# Patient Record
Sex: FEMALE | Race: WHITE | Employment: OTHER | URBAN - METROPOLITAN AREA
[De-identification: names, ages, dates, MRNs, and addresses within clinical notes are randomized per-mention and may not be internally consistent; named-entity substitution may affect disease eponyms.]

---

## 2020-11-11 LAB
ALBUMIN SERPL-MCNC: NORMAL G/DL
ALP BLD-CCNC: NORMAL U/L
ALT SERPL-CCNC: NORMAL U/L
ANION GAP SERPL CALCULATED.3IONS-SCNC: NORMAL MMOL/L
AST SERPL-CCNC: NORMAL U/L
AVERAGE GLUCOSE: NORMAL
BILIRUB SERPL-MCNC: NORMAL MG/DL
BUN BLDV-MCNC: NORMAL MG/DL
CALCIUM SERPL-MCNC: NORMAL MG/DL
CHLORIDE BLD-SCNC: NORMAL MMOL/L
CO2: NORMAL
CREAT SERPL-MCNC: NORMAL MG/DL
GFR CALCULATED: NORMAL
GLUCOSE BLD-MCNC: NORMAL MG/DL
HBA1C MFR BLD: 6.5 %
POTASSIUM SERPL-SCNC: NORMAL MMOL/L
SODIUM BLD-SCNC: NORMAL MMOL/L
TOTAL PROTEIN: NORMAL
VITAMIN D 25-HYDROXY: NORMAL
VITAMIN D2, 25 HYDROXY: NORMAL
VITAMIN D3,25 HYDROXY: NORMAL

## 2020-11-12 LAB
CHOLESTEROL, TOTAL: NORMAL
CHOLESTEROL/HDL RATIO: NORMAL
HDLC SERPL-MCNC: NORMAL MG/DL
LDL CHOLESTEROL CALCULATED: NORMAL
NONHDLC SERPL-MCNC: NORMAL MG/DL
TRIGL SERPL-MCNC: NORMAL MG/DL
VLDLC SERPL CALC-MCNC: NORMAL MG/DL

## 2021-04-13 LAB
ALBUMIN SERPL-MCNC: NORMAL G/DL
ALP BLD-CCNC: NORMAL U/L
ALT SERPL-CCNC: NORMAL U/L
ANION GAP SERPL CALCULATED.3IONS-SCNC: NORMAL MMOL/L
AST SERPL-CCNC: NORMAL U/L
AVERAGE GLUCOSE: NORMAL
BILIRUB SERPL-MCNC: NORMAL MG/DL
BUN BLDV-MCNC: NORMAL MG/DL
CALCIUM SERPL-MCNC: NORMAL MG/DL
CHLORIDE BLD-SCNC: NORMAL MMOL/L
CHOLESTEROL, TOTAL: NORMAL
CHOLESTEROL/HDL RATIO: NORMAL
CO2: NORMAL
CREAT SERPL-MCNC: NORMAL MG/DL
GFR CALCULATED: NORMAL
GLUCOSE BLD-MCNC: NORMAL MG/DL
HBA1C MFR BLD: 5.9 %
HDLC SERPL-MCNC: NORMAL MG/DL
LDL CHOLESTEROL CALCULATED: NORMAL
NONHDLC SERPL-MCNC: NORMAL MG/DL
POTASSIUM SERPL-SCNC: NORMAL MMOL/L
SODIUM BLD-SCNC: NORMAL MMOL/L
TOTAL PROTEIN: NORMAL
TRIGL SERPL-MCNC: NORMAL MG/DL
VITAMIN D 25-HYDROXY: NORMAL
VITAMIN D2, 25 HYDROXY: NORMAL
VITAMIN D3,25 HYDROXY: NORMAL
VLDLC SERPL CALC-MCNC: NORMAL MG/DL

## 2021-06-23 ENCOUNTER — OFFICE VISIT (OUTPATIENT)
Dept: BARIATRICS/WEIGHT MGMT | Age: 71
End: 2021-06-23
Payer: MEDICARE

## 2021-06-23 VITALS
BODY MASS INDEX: 45.99 KG/M2 | HEART RATE: 57 BPM | SYSTOLIC BLOOD PRESSURE: 132 MMHG | TEMPERATURE: 96.9 F | DIASTOLIC BLOOD PRESSURE: 61 MMHG | RESPIRATION RATE: 20 BRPM | WEIGHT: 293 LBS | HEIGHT: 67 IN

## 2021-06-23 DIAGNOSIS — E66.01 CLASS 3 SEVERE OBESITY DUE TO EXCESS CALORIES WITH SERIOUS COMORBIDITY AND BODY MASS INDEX (BMI) OF 45.0 TO 49.9 IN ADULT (HCC): ICD-10-CM

## 2021-06-23 DIAGNOSIS — M25.561 CHRONIC PAIN OF RIGHT KNEE: Primary | ICD-10-CM

## 2021-06-23 DIAGNOSIS — G89.29 CHRONIC PAIN OF RIGHT KNEE: Primary | ICD-10-CM

## 2021-06-23 PROCEDURE — G8399 PT W/DXA RESULTS DOCUMENT: HCPCS | Performed by: INTERNAL MEDICINE

## 2021-06-23 PROCEDURE — 99202 OFFICE O/P NEW SF 15 MIN: CPT

## 2021-06-23 PROCEDURE — 99204 OFFICE O/P NEW MOD 45 MIN: CPT | Performed by: INTERNAL MEDICINE

## 2021-06-23 PROCEDURE — G8428 CUR MEDS NOT DOCUMENT: HCPCS | Performed by: INTERNAL MEDICINE

## 2021-06-23 PROCEDURE — 4040F PNEUMOC VAC/ADMIN/RCVD: CPT | Performed by: INTERNAL MEDICINE

## 2021-06-23 PROCEDURE — 1036F TOBACCO NON-USER: CPT | Performed by: INTERNAL MEDICINE

## 2021-06-23 PROCEDURE — 1090F PRES/ABSN URINE INCON ASSESS: CPT | Performed by: INTERNAL MEDICINE

## 2021-06-23 PROCEDURE — 1123F ACP DISCUSS/DSCN MKR DOCD: CPT | Performed by: INTERNAL MEDICINE

## 2021-06-23 PROCEDURE — 3017F COLORECTAL CA SCREEN DOC REV: CPT | Performed by: INTERNAL MEDICINE

## 2021-06-23 PROCEDURE — G8417 CALC BMI ABV UP PARAM F/U: HCPCS | Performed by: INTERNAL MEDICINE

## 2021-06-23 NOTE — PROGRESS NOTES
CC -   Ortho Referral (Dr. Kya Jara) - preparing for TKA  Right knee pain, Obesity    BACKGROUND -   First visit: 6/23/21     Obesity   Began in childhood  Initial BMI 47.4, Wt 298.0 lbs  HS Grad wt unknown (no scale in [facility]; weighed 200 lbs upon coming to [address] at age 24)   Lowest   wt 189 lbs  Highest  wt  303 lbs  Pattern of wt gain: grad  Wt change past yr: down 4 lbs  Most wt lost: 40 lbs (eat less/right)  Other diets attempted: none    Desire to lose weight: 10/10  Prob posed by appetite: 7-8/10    Initial Diet:    Number of meals per day - 3    Number of snacks per day - 3    Meal volume - 12\" plate, sometimes seconds    Fast food/convenience store - 0-1x/week    Restaurants (not fast food) - 0x/week   Sweets - 2d/week (one piece of cake or sweet bread)   Chips - 2d/week (1 handful of sweet potato)    Crackers/pretzels - 2-3d/week (4-5 saltines or multigrain)   Nuts - 1d/week   Peanut Butter - 0d/week   Popcorn - 0d/week   Dried fruit - 2-3d/week (5 prunes (23 joselito each))   Whole fruit - 7d/week (1-2 servings/day)   Breakfast cereal - 1d/week (oatmeal)   Granola/Protein/Energy bar - 0d/week   Sugar sweetened beverages - none   Protein - No supplements   Fiber - No supplements     Exercise:    Gym membership - none    Walking - none    Running - none    Resistance - none    Aerobic class - none      ______________________    [facility] -  Past Medical History:   Diagnosis Date    Arthritis     Cataract, right eye     Chronic pain of right knee     Class 3 severe obesity due to excess calories with serious comorbidity and body mass index (BMI) of 45.0 to 49.9 in adult (HCC)     Diabetes mellitus (HCC)     Hypertension      Current Outpatient Medications   Medication Sig Dispense Refill    Naproxen (NAPROSYN PO) Take by mouth      pioglitazone (ACTOS) 30 MG tablet Take 30 mg by mouth daily      valsartan-hydrochlorothiazide (DIOVAN-HCT) 160-25 MG per tablet Take 1 tablet by mouth nightly      Cholecalciferol (VITAMIN D3) 1000 units CAPS Take by mouth daily Instructed to hold 5 days pre-op      OMEGA-3 KRILL OIL PO Take 1 tablet by mouth daily Instructed to hold 5 days pre-op      aspirin 81 MG tablet Take 81 mg by mouth daily To be held 10 days  per Dr. Polly Price HCL PO Take 240 mg by mouth nightly       METFORMIN HCL PO Take 500 mg by mouth 2 times daily        No current facility-administered medications for this visit. ROS -  Card - no CP  GI - no N/V/D/C    PE -  Gen : BP (!) 165/79 (Site: Right Lower Arm, Position: Sitting, Cuff Size: Large Adult)   Pulse 58   Temp 96.9 °F (36.1 °C) (Temporal)   Resp 20   Ht 5' 6.5\" (1.689 m)   Wt 298 lb (135.2 kg)   BMI 47.38 kg/m²    Repeat /61   WN, WD, NAD  Lung: Nml resp effort  Psych: Normal mood   Full affect  Neuro:  Moves all ext well  ______________________    HISTORY & ASSESSMENT/PLAN -     Problem 1  - Right knee pain  HPI   - Began age 79      Severity past week: 6/10       Sees Dr. Naomie James TKA      Must first reduce BMI to 40 (wt 255 lbs)  Assessment  - Uncontrolled  Plan   - Proceed with wt reduction per the plan below    Problem 2  - Obesity   HPI   - See above Background for description    Weight  Date    298.0 lbs 6/23/21    Assessment  - uncontrolled; her diet is clean; proceed with the following low joselito, low carb diet  Plan   -   Count all calories and limit them to 900 calories per day    Use the plan below as a guide for this:    Breakfast  -   4 oz of grilled, broiled or baked turkey, chicken or fish    or 3 whole eggs with 3/4 cup of Egg Beaters (egg whites) (may add any vegetable that has <5 joselito)   or a protein shake (Premier, Pure Protein or Fairlife  Eat 22 grams of fiber (12 tablespoons of the original, plain Fiber One cereal or 4 tablespoons of wheat dextrin powder (Benefiber or generic brand); for both of these, start with 1/8th - 1/4th the target amount and every week add another 1/8th - 1/4th until reaching the target). This is for the health of the colon. Its purpose is not to prevent or treat constipation.     Lunch -   4 oz of grilled, broiled or baked turkey, chicken or fish    or 3 whole eggs with 3/4 cup of Egg Beaters (egg whites) (may add any vegetable that has <5 josleito)   or a protein shake (Premier, Pure Protein or Fairlife  Eat 1 1/2 tablespoon of oil-based dressing (such as Ranch or Caesar) or 1/4 cup of [place] dressing   or 1 and 1/2 tablespoon of peanut butter   or 22 almonds or cashews    Dinner -  Eat a 350 joselito meal (do not eat any sweets)     Total time spent on encounter: 50 min    Gabriela Shaffer MD  Endocrinology/Obesity  6/23/21

## 2021-06-23 NOTE — PATIENT INSTRUCTIONS
Count all calories and limit them to 900 calories per day    Follow the plan below:    Breakfast  -   4 oz of grilled, broiled or baked turkey, chicken or fish    or 3 whole eggs with 3/4 cup of Egg Beaters (egg whites) (may add any vegetable that has <5 joselito)   or a protein shake (Premier, Pure Protein or Fairlife  Eat 22 grams of fiber (12 tablespoons of the original, plain Fiber One cereal or 4 tablespoons of wheat dextrin powder (Benefiber or generic brand); for both of these, start with 1/8th - 1/4th the target amount and every week add another 1/8th - 1/4th until reaching the target). This is for the health of the colon. Its purpose is not to prevent or treat constipation.     Lunch -   4 oz of grilled, broiled or baked turkey, chicken or fish    or 3 whole eggs with 3/4 cup of Egg Beaters (egg whites) (may add any vegetable that has <5 joselito)   or a protein shake (Premier, Pure Protein or Fairlife  Eat 1 1/2 tablespoon of oil-based dressing (such as Ranch or Caesar) or 1/4 cup of [place] dressing   or 1 and 1/2 tablespoon of peanut butter   or 22 almonds or cashews    Dinner -  Eat a 350 joselito meal (do not eat any sweets)

## 2021-07-29 ENCOUNTER — OFFICE VISIT (OUTPATIENT)
Dept: BARIATRICS/WEIGHT MGMT | Age: 71
End: 2021-07-29
Payer: MEDICARE

## 2021-07-29 VITALS
TEMPERATURE: 97.1 F | WEIGHT: 276.8 LBS | BODY MASS INDEX: 43.44 KG/M2 | DIASTOLIC BLOOD PRESSURE: 63 MMHG | SYSTOLIC BLOOD PRESSURE: 137 MMHG | HEIGHT: 67 IN | HEART RATE: 58 BPM

## 2021-07-29 DIAGNOSIS — M25.561 CHRONIC PAIN OF RIGHT KNEE: Primary | ICD-10-CM

## 2021-07-29 DIAGNOSIS — E66.01 CLASS 3 SEVERE OBESITY DUE TO EXCESS CALORIES WITH SERIOUS COMORBIDITY AND BODY MASS INDEX (BMI) OF 45.0 TO 49.9 IN ADULT (HCC): ICD-10-CM

## 2021-07-29 DIAGNOSIS — G89.29 CHRONIC PAIN OF RIGHT KNEE: Primary | ICD-10-CM

## 2021-07-29 PROCEDURE — G8428 CUR MEDS NOT DOCUMENT: HCPCS | Performed by: INTERNAL MEDICINE

## 2021-07-29 PROCEDURE — 99213 OFFICE O/P EST LOW 20 MIN: CPT | Performed by: INTERNAL MEDICINE

## 2021-07-29 PROCEDURE — 4040F PNEUMOC VAC/ADMIN/RCVD: CPT | Performed by: INTERNAL MEDICINE

## 2021-07-29 PROCEDURE — 1036F TOBACCO NON-USER: CPT | Performed by: INTERNAL MEDICINE

## 2021-07-29 PROCEDURE — 1090F PRES/ABSN URINE INCON ASSESS: CPT | Performed by: INTERNAL MEDICINE

## 2021-07-29 PROCEDURE — 99211 OFF/OP EST MAY X REQ PHY/QHP: CPT

## 2021-07-29 PROCEDURE — G8399 PT W/DXA RESULTS DOCUMENT: HCPCS | Performed by: INTERNAL MEDICINE

## 2021-07-29 PROCEDURE — 3017F COLORECTAL CA SCREEN DOC REV: CPT | Performed by: INTERNAL MEDICINE

## 2021-07-29 PROCEDURE — 1123F ACP DISCUSS/DSCN MKR DOCD: CPT | Performed by: INTERNAL MEDICINE

## 2021-07-29 PROCEDURE — G8417 CALC BMI ABV UP PARAM F/U: HCPCS | Performed by: INTERNAL MEDICINE

## 2021-07-29 NOTE — PROGRESS NOTES
CC -   Ortho Referral (Dr. Bennett ) - preparing for TKA  Right knee pain, Obesity    BACKGROUND -   Last visit: 6/23/21  First visit: 6/23/21     Obesity   Began in childhood  Initial BMI 47.4, Wt 298.0 lbs  HS Grad wt unknown (no scale in [address] [place] [address]; weighed 200 lbs upon coming to [address] [facility] at age 24)   Lowest   wt 189 lbs  Highest  wt  303 lbs  Pattern of wt gain: grad  Wt change past yr: down 4 lbs  Most wt lost: 40 lbs (eat less/right)  Other diets attempted: none    Desire to lose weight: 10/10  Prob posed by appetite: 7-8/10    Initial Diet:    Number of meals per day - 3    Number of snacks per day - 3    Meal volume - 12\" plate, sometimes seconds    Fast food/convenience store - 0-1x/week    Restaurants (not fast food) - 0x/week   Sweets - 2d/week (one piece of cake or sweet bread)   Chips - 2d/week (1 handful of sweet potato)    Crackers/pretzels - 2-3d/week (4-5 saltines or multigrain)   Nuts - 1d/week   Peanut Butter - 0d/week   Popcorn - 0d/week   Dried fruit - 2-3d/week (5 prunes (23 joselito each))   Whole fruit - 7d/week (1-2 servings/day)   Breakfast cereal - 1d/week (oatmeal)   Granola/Protein/Energy bar - 0d/week   Sugar sweetened beverages - none   Protein - No supplements   Fiber - No supplements     Exercise:    Gym membership - none    Walking - none    Running - none    Resistance - none    Aerobic class - none      ______________________    [facility] -  Past Medical History:   Diagnosis Date    Arthritis     Cataract, right eye     Chronic pain of right knee     Class 3 severe obesity due to excess calories with serious comorbidity and body mass index (BMI) of 45.0 to 49.9 in adult (HCC)     Diabetes mellitus (HCC)     Hypertension      Current Outpatient Medications   Medication Sig Dispense Refill    Naproxen (NAPROSYN PO) Take by mouth      pioglitazone (ACTOS) 30 MG tablet Take 30 mg by mouth daily      valsartan-hydrochlorothiazide (DIOVAN-HCT) 160-25 MG per tablet Take 1 tablet by mouth nightly      Cholecalciferol (VITAMIN D3) 1000 units CAPS Take by mouth daily Instructed to hold 5 days pre-op      OMEGA-3 KRILL OIL PO Take 1 tablet by mouth daily Instructed to hold 5 days pre-op      aspirin 81 MG tablet Take 81 mg by mouth daily To be held 10 days  per Dr. Jaqueline Hodge HCL PO Take 240 mg by mouth nightly       METFORMIN HCL PO Take 500 mg by mouth 2 times daily        No current facility-administered medications for this visit. PE -  Gen : /63 (Site: Left Upper Arm, Position: Sitting, Cuff Size: Large Adult)   Pulse 58   Temp 97.1 °F (36.2 °C) (Temporal)   Ht 5' 6.5\" (1.689 m)   Wt 276 lb 12.8 oz (125.6 kg)   BMI 44.01 kg/m²    Repeat /61   WN, WD, NAD  Lung: Nml resp effort  Psych: Normal mood   Full affect  Neuro: Moves all ext well  ______________________    HISTORY & ASSESSMENT/PLAN -     Problem 1  - Right knee pain  HPI   - Began age 79      Severity past week: 4/10       Sees Dr. Koby Clark TKA      Must first reduce BMI to 40 (wt 255 lbs)  Assessment  - Uncontrolled  Plan   - Proceed with wt reduction per the plan below    Problem 2  - Obesity   HPI   - See above Background for description    Weight  Date    298.0 lbs 6/23/21    276.8 lbs 7/29/21  Total wt loss to date: 21.2 lbs  DEN = 1850 joselito/d  Avg daily energy variance:   6/23/21-7/29/21 = 19.4 lbs (67,900 joselito/day)/35d = - 1,940 joselito/d deficit  Update:   Following her plan 7d/wk  Assessment  - Improving; cont to use the current plan without change  Plan   -   Count all calories and limit them to 900 calories per day  -120 mg/dl with only metformin 500 mg twice daily  Does not want an appetite suppressant    Use the plan below as a guide for this:    Breakfast  -   4 oz of grilled, broiled or baked turkey, chicken or fish    or 3 whole eggs with 3/4 cup of Egg Beaters (egg whites) (may add any vegetable that has <5 joselito)   or a protein shake (Premier, Pure Protein or [employer] 22 grams of fiber (12 tablespoons of the original, plain Fiber One cereal or 4 tablespoons of wheat dextrin powder (Benefiber or generic brand); for both of these, start with 1/8th - 1/4th the target amount and every week add another 1/8th - 1/4th until reaching the target). This is for the health of the colon. Its purpose is not to prevent or treat constipation.     Lunch -   4 oz of grilled, broiled or baked turkey, chicken or fish    or 3 whole eggs with 3/4 cup of Egg Beaters (egg whites) (may add any vegetable that has <5 joselito)   or a protein shake (Premier, Pure Protein or Fairlife  Eat 1 1/2 tablespoon of oil-based dressing (such as Ranch or Caesar) or 1/4 cup of [place] dressing   or 1 and 1/2 tablespoon of peanut butter   or 22 almonds or cashews    Dinner -  Eat a 350 joselito meal (do not eat any sweets)     Jenny Eagle MD  Endocrinology/Obesity  7/29/21

## 2021-07-29 NOTE — PATIENT INSTRUCTIONS
Count all calories and limit them to 900 calories per day    Use the plan below as a guide for this:    Breakfast  -   4 oz of grilled, broiled or baked turkey, chicken or fish    or 3 whole eggs with 3/4 cup of Egg Beaters (egg whites) (may add any vegetable that has <5 joselito)   or a protein shake (Premier, Pure Protein or Fairlife  Eat 22 grams of fiber (12 tablespoons of the original, plain Fiber One cereal or 4 tablespoons of wheat dextrin powder (Benefiber or generic brand); for both of these, start with 1/8th - 1/4th the target amount and every week add another 1/8th - 1/4th until reaching the target). This is for the health of the colon. Its purpose is not to prevent or treat constipation.     Lunch -   4 oz of grilled, broiled or baked turkey, chicken or fish    or 3 whole eggs with 3/4 cup of Egg Beaters (egg whites) (may add any vegetable that has <5 joselito)   or a protein shake (Premier, Pure Protein or Fairlife  Eat 1 1/2 tablespoon of oil-based dressing (such as Ranch or Caesar) or 1/4 cup of [place] dressing   or 1 and 1/2 tablespoon of peanut butter   or 22 almonds or cashews    Dinner -  Eat a 350 joselito meal (do not eat any sweets)

## 2021-08-24 ENCOUNTER — TELEPHONE (OUTPATIENT)
Dept: FAMILY MEDICINE CLINIC | Age: 71
End: 2021-08-24

## 2021-08-24 NOTE — TELEPHONE ENCOUNTER
----- Message from Surekha Sanon 12 sent at 8/24/2021 10:26 AM EDT -----  Subject: Message to Provider    QUESTIONS  Information for Provider? Patient has a new appointment with  on   9/15 until her doctor comes back in January. She takes the medication   METFORMIN HCL PO before her appointment and would to know if this can be   refilled until she is seen  ---------------------------------------------------------------------------  --------------  [address]  What is the best way for the office to contact you? OK to leave message on   voicemail  Preferred Call Back Phone Number? [phone]  ---------------------------------------------------------------------------  --------------  SCRIPT ANSWERS  Relationship to Patient?  Self

## 2021-08-24 NOTE — TELEPHONE ENCOUNTER
Patient will be out of metformin 2 days before appt and was advised that refill would be taken care of when she was seen in office,

## 2021-09-08 ENCOUNTER — OFFICE VISIT (OUTPATIENT)
Dept: BARIATRICS/WEIGHT MGMT | Age: 71
End: 2021-09-08
Payer: MEDICARE

## 2021-09-08 VITALS
HEART RATE: 66 BPM | BODY MASS INDEX: 41.15 KG/M2 | WEIGHT: 262.2 LBS | SYSTOLIC BLOOD PRESSURE: 142 MMHG | DIASTOLIC BLOOD PRESSURE: 71 MMHG | TEMPERATURE: 97.9 F | HEIGHT: 67 IN

## 2021-09-08 DIAGNOSIS — M25.561 CHRONIC PAIN OF RIGHT KNEE: Primary | ICD-10-CM

## 2021-09-08 DIAGNOSIS — E66.01 CLASS 3 SEVERE OBESITY DUE TO EXCESS CALORIES WITH SERIOUS COMORBIDITY AND BODY MASS INDEX (BMI) OF 45.0 TO 49.9 IN ADULT (HCC): ICD-10-CM

## 2021-09-08 DIAGNOSIS — G89.29 CHRONIC PAIN OF RIGHT KNEE: Primary | ICD-10-CM

## 2021-09-08 PROCEDURE — G8428 CUR MEDS NOT DOCUMENT: HCPCS | Performed by: INTERNAL MEDICINE

## 2021-09-08 PROCEDURE — 1036F TOBACCO NON-USER: CPT | Performed by: INTERNAL MEDICINE

## 2021-09-08 PROCEDURE — 1123F ACP DISCUSS/DSCN MKR DOCD: CPT | Performed by: INTERNAL MEDICINE

## 2021-09-08 PROCEDURE — 99213 OFFICE O/P EST LOW 20 MIN: CPT | Performed by: INTERNAL MEDICINE

## 2021-09-08 PROCEDURE — 1090F PRES/ABSN URINE INCON ASSESS: CPT | Performed by: INTERNAL MEDICINE

## 2021-09-08 PROCEDURE — 4040F PNEUMOC VAC/ADMIN/RCVD: CPT | Performed by: INTERNAL MEDICINE

## 2021-09-08 PROCEDURE — 3017F COLORECTAL CA SCREEN DOC REV: CPT | Performed by: INTERNAL MEDICINE

## 2021-09-08 PROCEDURE — G8399 PT W/DXA RESULTS DOCUMENT: HCPCS | Performed by: INTERNAL MEDICINE

## 2021-09-08 PROCEDURE — G8417 CALC BMI ABV UP PARAM F/U: HCPCS | Performed by: INTERNAL MEDICINE

## 2021-09-08 PROCEDURE — 99211 OFF/OP EST MAY X REQ PHY/QHP: CPT

## 2021-09-08 NOTE — PROGRESS NOTES
CC -   Ortho Referral (Dr. Nimesh Sandoval) - preparing for TKA  Right knee pain, Obesity    BACKGROUND -   Last visit: 7/29/21  First visit: 6/23/21     Obesity   Began in childhood  Initial BMI 47.4, Wt 298.0 lbs  HS Grad wt unknown (no scale in [address] [place] [address]; weighed 200 lbs upon coming to [facility] at age 24)   Lowest   wt 189 lbs  Highest  wt  303 lbs  Pattern of wt gain: grad  Wt change past yr: down 4 lbs  Most wt lost: 40 lbs (eat less/right)  Other diets attempted: none    Desire to lose weight: 10/10  Prob posed by appetite: 7-8/10    Initial Diet:    Number of meals per day - 3    Number of snacks per day - 3    Meal volume - 12\" plate, sometimes seconds    Fast food/convenience store - 0-1x/week    Restaurants (not fast food) - 0x/week   Sweets - 2d/week (one piece of cake or sweet bread)   Chips - 2d/week (1 handful of sweet potato)    Crackers/pretzels - 2-3d/week (4-5 saltines or multigrain)   Nuts - 1d/week   Peanut Butter - 0d/week   Popcorn - 0d/week   Dried fruit - 2-3d/week (5 prunes (23 joselito each))   Whole fruit - 7d/week (1-2 servings/day)   Breakfast cereal - 1d/week (oatmeal)   Granola/Protein/Energy bar - 0d/week   Sugar sweetened beverages - none   Protein - No supplements   Fiber - No supplements     Exercise:    Gym membership - none    Walking - none    Running - none    Resistance - none    Aerobic class - none      ______________________    [address] [place] [address] -  Past Medical History:   Diagnosis Date    Arthritis     Cataract, right eye     Chronic pain of right knee     Class 3 severe obesity due to excess calories with serious comorbidity and body mass index (BMI) of 45.0 to 49.9 in adult (HCC)     Diabetes mellitus (HCC)     Hypertension      Current Outpatient Medications   Medication Sig Dispense Refill    Naproxen (NAPROSYN PO) Take by mouth      valsartan-hydrochlorothiazide (DIOVAN-HCT) 160-25 MG per tablet Take 1 tablet by mouth nightly      Cholecalciferol (VITAMIN D3) 1000 units CAPS Take by mouth daily Instructed to hold 5 days pre-op      OMEGA-3 KRILL OIL PO Take 1 tablet by mouth daily Instructed to hold 5 days pre-op      aspirin 81 MG tablet Take 81 mg by mouth daily To be held 10 days  per Dr. Jan Segal HCL PO Take 240 mg by mouth nightly       METFORMIN HCL PO Take 500 mg by mouth 2 times daily        No current facility-administered medications for this visit. PE -  Gen : BP (!) 142/71 (Site: Left Upper Arm, Position: Sitting, Cuff Size: Large Adult)   Pulse 66   Temp 97.9 °F (36.6 °C) (Temporal)   Ht 5' 6.5\" (1.689 m)   Wt 262 lb 3.2 oz (118.9 kg)   BMI 41.69 kg/m²    Repeat /61   WN, WD, NAD  Lung: Nml resp effort  Psych: Normal mood   Full affect  Neuro: Moves all ext well  ______________________    HISTORY & ASSESSMENT/PLAN -     Problem 1  - Right knee pain  HPI   - Began age 79      Severity past week: 8/11       Sees Dr. Lori Francis in one week      Needs R TKA      Must first reduce BMI to 40 (wt 255 lbs)      Having R hip pain 7/10 (this waxes and wanes)  Assessment  - Uncontrolled  Plan   - Proceed with wt reduction per the plan below    Problem 2  - Obesity   HPI   - See above Background for description    Weight  Date    298.0 lbs 6/23/21    276.8 lbs 7/29/21    262.2 lbs 9/08/21  Total wt loss to date: 35.8 lbs  DEN = 1850 joselito/d  Avg daily energy variance:   6/23/21-7/29/21 = - 19.4 lbs (67,900 joselito/day)/35d = - 1,940 joselito/d deficit   7/29/21-9/08/21 = - 14.6 lbs (51,100 joselito/day)/41d = - 1,246 joselito/d deficit  Update:   Following her plan 7d/wk  -120 mg/dl with only metformin 500 mg twice daily  Still does not want an appetite suppressant  Assessment  - Improving; cont with current plan  Plan   -   Count all calories and limit them to 900 calories per day    Use the plan below as a guide for this:    Breakfast  -   4 oz of grilled, broiled or baked turkey, chicken or fish    or 3 whole eggs with 3/4 cup of Egg Beaters (egg whites) (may add any vegetable that has <5 joselito)   or a protein shake (Premier, Pure Protein or Fairlife  Eat 22 grams of fiber (12 tablespoons of the original, plain Fiber One cereal or 4 tablespoons of wheat dextrin powder (Benefiber or generic brand); for both of these, start with 1/8th - 1/4th the target amount and every week add another 1/8th - 1/4th until reaching the target). This is for the health of the colon. Its purpose is not to prevent or treat constipation.     Lunch -   4 oz of grilled, broiled or baked turkey, chicken or fish    or 3 whole eggs with 3/4 cup of Egg Beaters (egg whites) (may add any vegetable that has <5 joselito)   or a protein shake (Premier, Pure Protein or Fairlife  Eat 1 1/2 tablespoon of oil-based dressing (such as Ranch or Caesar) or 1/4 cup of [place] dressing   or 1 and 1/2 tablespoon of peanut butter   or 22 almonds or cashews    Dinner -  Eat a 350 joselito meal (do not eat any sweets)    See me back in month     Km Spence MD  Endocrinology/Obesity  9/8/21

## 2021-09-14 ENCOUNTER — TELEPHONE (OUTPATIENT)
Dept: FAMILY MEDICINE CLINIC | Age: 71
End: 2021-09-14

## 2021-09-14 NOTE — TELEPHONE ENCOUNTER
----- Message from Rodger Elmoer sent at 9/13/2021  1:08 PM EDT -----  Subject: Appointment Request    Reason for Call: New Patient Request Appointment    QUESTIONS  Type of Appointment? New Patient/New to Provider  Reason for appointment request? No appointments available during search  Additional Information for Provider? Patient calling to switch new pt appt   to [facility] due to her provider recommending him. Please call her back to   reschedule appt. No available during search.   ---------------------------------------------------------------------------  --------------  CALL BACK INFO  What is the best way for the office to contact you? OK to leave message on   voicemail  Preferred Call Back Phone Number? [phone]  ---------------------------------------------------------------------------  --------------  SCRIPT ANSWERS  Relationship to Patient? Self  Have your symptoms changed? No  Have you been diagnosed with, awaiting test results for, or told that you   are suspected of having COVID-19 (Coronavirus)? (If patient has tested   negative or was tested as a requirement for work, school, or travel and   not based on symptoms, answer no)? No  Within the past two weeks have you developed any of the following symptoms   (answer no if symptoms have been present longer than 2 weeks or began   more than 2 weeks ago)? Fever or Chills, Cough, Shortness of breath or   difficulty breathing, Loss of taste or smell, Sore throat, Nasal   congestion, Sneezing or runny nose, Fatigue or generalized body aches   (answer no if pain is specific to a body part e.g. back pain), Diarrhea,   Headache? No  Have you had close contact with someone with COVID-19 in the last 14 days? No  (Service Expert  click yes below to proceed with [employer] As Usual   Scheduling)?  Yes

## 2021-09-16 ENCOUNTER — OFFICE VISIT (OUTPATIENT)
Dept: FAMILY MEDICINE CLINIC | Age: 71
End: 2021-09-16
Payer: MEDICARE

## 2021-09-16 VITALS
OXYGEN SATURATION: 98 % | HEIGHT: 67 IN | WEIGHT: 261 LBS | HEART RATE: 57 BPM | TEMPERATURE: 97.2 F | DIASTOLIC BLOOD PRESSURE: 80 MMHG | BODY MASS INDEX: 40.97 KG/M2 | SYSTOLIC BLOOD PRESSURE: 120 MMHG | RESPIRATION RATE: 20 BRPM

## 2021-09-16 DIAGNOSIS — E11.9 TYPE 2 DIABETES MELLITUS WITHOUT COMPLICATION, WITHOUT LONG-TERM CURRENT USE OF INSULIN (HCC): ICD-10-CM

## 2021-09-16 DIAGNOSIS — I10 ESSENTIAL HYPERTENSION: ICD-10-CM

## 2021-09-16 DIAGNOSIS — R01.2 LOUD S2 (SECOND HEART SOUND): ICD-10-CM

## 2021-09-16 DIAGNOSIS — Z91.81 AT HIGH RISK FOR FALLS: ICD-10-CM

## 2021-09-16 DIAGNOSIS — E66.01 CLASS 3 SEVERE OBESITY DUE TO EXCESS CALORIES WITH SERIOUS COMORBIDITY AND BODY MASS INDEX (BMI) OF 45.0 TO 49.9 IN ADULT (HCC): ICD-10-CM

## 2021-09-16 DIAGNOSIS — R60.0 BILATERAL LOWER EXTREMITY EDEMA: ICD-10-CM

## 2021-09-16 DIAGNOSIS — M25.561 CHRONIC PAIN OF RIGHT KNEE: Primary | ICD-10-CM

## 2021-09-16 DIAGNOSIS — G89.29 CHRONIC PAIN OF RIGHT KNEE: Primary | ICD-10-CM

## 2021-09-16 DIAGNOSIS — R00.1 BRADYCARDIA: ICD-10-CM

## 2021-09-16 LAB — HBA1C MFR BLD: 5.9 %

## 2021-09-16 PROCEDURE — 83036 HEMOGLOBIN GLYCOSYLATED A1C: CPT | Performed by: STUDENT IN AN ORGANIZED HEALTH CARE EDUCATION/TRAINING PROGRAM

## 2021-09-16 PROCEDURE — 99204 OFFICE O/P NEW MOD 45 MIN: CPT | Performed by: STUDENT IN AN ORGANIZED HEALTH CARE EDUCATION/TRAINING PROGRAM

## 2021-09-16 RX ORDER — CARVEDILOL 25 MG/1
25 TABLET ORAL 2 TIMES DAILY WITH MEALS
COMMUNITY
End: 2021-09-16 | Stop reason: DRUGHIGH

## 2021-09-16 RX ORDER — VALSARTAN AND HYDROCHLOROTHIAZIDE 160; 25 MG/1; MG/1
1 TABLET ORAL NIGHTLY
Qty: 90 TABLET | Refills: 1 | Status: SHIPPED
Start: 2021-09-16 | End: 2022-01-26

## 2021-09-16 RX ORDER — VERAPAMIL HYDROCHLORIDE 80 MG/1
160 TABLET ORAL NIGHTLY
Qty: 180 TABLET | Refills: 1 | Status: SHIPPED
Start: 2021-09-16 | End: 2021-11-10 | Stop reason: ALTCHOICE

## 2021-09-16 RX ORDER — BLOOD SUGAR DIAGNOSTIC
STRIP MISCELLANEOUS
COMMUNITY
Start: 2021-09-10

## 2021-09-16 RX ORDER — CARVEDILOL 12.5 MG/1
12.5 TABLET ORAL DAILY
Qty: 90 TABLET | Refills: 1 | Status: SHIPPED
Start: 2021-09-16 | End: 2021-09-21 | Stop reason: SDUPTHER

## 2021-09-16 SDOH — ECONOMIC STABILITY: FOOD INSECURITY: WITHIN THE PAST 12 MONTHS, THE FOOD YOU BOUGHT JUST DIDN'T LAST AND YOU DIDN'T HAVE MONEY TO GET MORE.: NEVER TRUE

## 2021-09-16 SDOH — ECONOMIC STABILITY: FOOD INSECURITY: WITHIN THE PAST 12 MONTHS, YOU WORRIED THAT YOUR FOOD WOULD RUN OUT BEFORE YOU GOT MONEY TO BUY MORE.: NEVER TRUE

## 2021-09-16 ASSESSMENT — ENCOUNTER SYMPTOMS
BLOOD IN STOOL: 0
ABDOMINAL PAIN: 0
NAUSEA: 0
DIARRHEA: 0
CONSTIPATION: 0
WHEEZING: 0
COUGH: 0
SHORTNESS OF BREATH: 0

## 2021-09-16 ASSESSMENT — PATIENT HEALTH QUESTIONNAIRE - PHQ9
SUM OF ALL RESPONSES TO PHQ QUESTIONS 1-9: 0
SUM OF ALL RESPONSES TO PHQ QUESTIONS 1-9: 0
SUM OF ALL RESPONSES TO PHQ9 QUESTIONS 1 & 2: 0
2. FEELING DOWN, DEPRESSED OR HOPELESS: 0
1. LITTLE INTEREST OR PLEASURE IN DOING THINGS: 0
SUM OF ALL RESPONSES TO PHQ QUESTIONS 1-9: 0

## 2021-09-16 ASSESSMENT — SOCIAL DETERMINANTS OF HEALTH (SDOH): HOW HARD IS IT FOR YOU TO PAY FOR THE VERY BASICS LIKE FOOD, HOUSING, MEDICAL CARE, AND HEATING?: NOT HARD AT ALL

## 2021-09-16 NOTE — ASSESSMENT & PLAN NOTE
Unclear control, continue current medications and Action of medications it could possibly be contributing to bradycardia today, can consider a monitor in the future

## 2021-09-16 NOTE — ASSESSMENT & PLAN NOTE
Uncontrolled, continue current medications commended compression socks, echo ordered, on chart review labs show normal CMP and TSH

## 2021-09-16 NOTE — PROGRESS NOTES
Goyo Sky (:  1950) is a 70 y.o. female,New patient, here for evaluation of the following:  Establish Care         ASSESSMENT/PLAN    1. Chronic pain of right knee  2. Class 3 severe obesity due to excess calories with serious comorbidity and body mass index (BMI) of 45.0 to 49.9 in adult ([address]  3. Essential hypertension  Assessment & Plan:   Well-controlled, medication adherence emphasized and lifestyle modifications recommended patient is on multiple blood pressure medications, she was bradycardic today, will decrease carvedilol down to 12.5 and decrease verapamil down to the recommended less than 180 mg a day dosing, will get echo as patient does have some history of possible valve/heart problems and does have loud S2 today, follow-up in 4 weeks for blood pressure check and review of echo  Orders:  -     carvedilol (COREG) 12.5 MG tablet; Take 1 tablet by mouth daily, Disp-90 tablet, R-1Normal  -     verapamil (CALAN) 80 MG tablet; Take 2 tablets by mouth nightly, Disp-180 tablet, R-1Normal  -     valsartan-hydroCHLOROthiazide (DIOVAN-HCT) 160-25 MG per tablet; Take 1 tablet by mouth nightly, Disp-90 tablet, R-1Normal  4. Type 2 diabetes mellitus without complication, without long-term current use of insulin (HCC)  Assessment & Plan:   Well-controlled, continue current medications and continue current treatment plan patient with 42 pound weight loss likely contributing to better diabetic control, also mention nutritionist is eating appropriate meals, discussed that she no longer needs to check her blood sugars daily, can rely on A1c, did provide her with information on hypoglycemia, and she can check her blood sugars at this time and follow procedure if they are less than 70, she can continue on Metformin, follow-up in 3 months  Orders:  -     POCT glycosylated hemoglobin (Hb A1C)  -     metFORMIN (GLUCOPHAGE) 500 MG tablet; Take 1 tablet by mouth 2 times daily, Disp-180 tablet, R-1Normal  5.  At high risk for falls  Assessment & Plan:   Uncontrolled, lifestyle modifications recommended patient has been through physical therapy, using cane for stability, has lost 42 pounds in order to get right hip surgery which will help with potential for falls, also decrease some of her blood pressure medications which may be causing bradycardia, reviewed home safety, follow-up in 4 weeks  6. Loud S2 (second heart sound)  -     Echocardiogram complete; Future  7. Bilateral lower extremity edema  Assessment & Plan:   Uncontrolled, continue current medications commended compression socks, echo ordered, on chart review labs show normal CMP and TSH  Orders:  -     Echocardiogram complete; Future  8. Bradycardia  Assessment & Plan:   Unclear control, continue current medications and Action of medications it could possibly be contributing to bradycardia today, can consider a monitor in the future    Return in about 4 weeks (around 10/14/2021) for Diabetes follow up. medication changes . Subjective   SUBJECTIVE/OBJECTIVE:  HPI  She has been following with ortho and may have hip replacement sooner or later. Her sugars have been about 111. Patient lost 42 lb in preparation for left hip surgery. She went to see nutritionist. She went on low joselito diet. Is on many blood pressure medications, she is unsure how they all were added to her over time, she notes she did have echo in the past with some kind of valve problem which is how she ended up on the verapamil, she notes she does have a daughter who has irregular heart rate and now has a pacemaker, she has intermittent episodes of lightheadedness, without falls, she is at high risk of falls, she plans to get her hip replaced, she has been through physical therapy    From chart review:  With diabetes type 2, last A1c 5.9, prior to that 6.5, currently on Metformin, vitamin D within normal limits, CMP within normal limits, normal kidney function, normal liver, TSH 3.25, CBC within normal limits LDL at 116, total cholesterol 192, HDL of 56, mammogram in 2019 within normal limits, dexascan in 2019 without abnormalities    Declined preventative screening identified as care gaps unless ordered through this visit    PHQ-2 Score: 0  PHQ-2 Over the past 2 weeks, how often have you been bothered by any of the following problems? Little interest or pleasure in doing things: Not at all  Feeling down, depressed, or hopeless: Not at all  PHQ-2 Score: 0   PHQ-9 Total Score: 0 (2021  3:08 PM)       Past Medical History:  has a past medical history of Arthritis, Cataract, right eye, Chronic back pain, Chronic pain of right knee, Class 3 severe obesity due to excess calories with serious comorbidity and body mass index (BMI) of 45.0 to 49.9 in [facility]), Diabetes mellitus ([facility] [address] and Hypertension. Past Surgical History:  has a past surgical history that includes  section; Colonoscopy (); back surgery (); joint replacement (Left, ); other surgical history (Left, 2017); and Cataract removal with implant (Right, 2017). Social History:  reports that she has never smoked. She has never used smokeless tobacco. She reports that she does not drink alcohol and does not use drugs. Family History: family history includes Diabetes in her brother and sister; Stroke in her brother.   Allergies: Codeine and Vicodin [hydrocodone-acetaminophen]  Medications:   Current Outpatient Medications   Medication Sig Dispense Refill    ACCU-CHEK YAAKOV PLUS strip TEST TWICE DAILY AS NEEDED      carvedilol (COREG) 12.5 MG tablet Take 1 tablet by mouth daily 90 tablet 1    metFORMIN (GLUCOPHAGE) 500 MG tablet Take 1 tablet by mouth 2 times daily 180 tablet 1    verapamil (CALAN) 80 MG tablet Take 2 tablets by mouth nightly 180 tablet 1    valsartan-hydroCHLOROthiazide (DIOVAN-HCT) 160-25 MG per tablet Take 1 tablet by mouth nightly 90 tablet 1    Cholecalciferol (VITAMIN D3) 1000 units CAPS Take by mouth daily Instructed to hold 5 days pre-op      OMEGA-3 KRILL OIL PO Take 1 tablet by mouth daily Instructed to hold 5 days pre-op       No current facility-administered medications for this visit. Allergies: Codeine and Vicodin [hydrocodone-acetaminophen]     Review of Systems   Constitutional: Negative for chills, fatigue, fever and unexpected weight change. HENT: Negative for hearing loss. Eyes: Negative for visual disturbance. Respiratory: Negative for cough, shortness of breath and wheezing. Cardiovascular: Negative for chest pain, palpitations and leg swelling. Gastrointestinal: Negative for abdominal pain, blood in stool, constipation, diarrhea and nausea. Genitourinary: Negative for dysuria. Musculoskeletal: Positive for arthralgias (right hip). Neurological: Negative for weakness, light-headedness, numbness and headaches. Psychiatric/Behavioral: Negative for dysphoric mood and sleep disturbance. The patient is not nervous/anxious. Objective   /80 (Site: Right Upper Arm, Position: Sitting, Cuff Size: Medium Adult)   Pulse 57   Temp 97.2 °F (36.2 °C) (Temporal)   Resp 20   Ht 5' 7\" (1.702 m)   Wt 261 lb (118.4 kg)   SpO2 98%   BMI 40.88 kg/m²       Lab Results   Component Value Date    LABA1C 5.9 09/16/2021    LABA1C 5.9 04/13/2021    LABA1C 6.5 11/11/2020           Physical Exam  Constitutional:       General: She is not in acute distress. Appearance: Normal appearance. HENT:      Head: Normocephalic and atraumatic. Right Ear: External ear normal.      Nose: Nose normal.      Mouth/Throat:      Mouth: Mucous membranes are moist.   Eyes:      Extraocular Movements: Extraocular movements intact. Conjunctiva/sclera: Conjunctivae normal.   Cardiovascular:      Rate and Rhythm: Normal rate and regular rhythm. Heart sounds: No murmur heard.      Pulmonary:      Effort: Pulmonary effort is normal.      Breath sounds: Normal breath sounds. No wheezing. Abdominal:      General: Abdomen is flat. Bowel sounds are normal.      Palpations: Abdomen is soft. Musculoskeletal:         General: Normal range of motion. Cervical back: Normal range of motion and neck supple. Lymphadenopathy:      Cervical: No cervical adenopathy. Neurological:      General: No focal deficit present. Mental Status: She is alert. Gait: Gait abnormal (Cane). Psychiatric:         Mood and Affect: Mood normal.         Behavior: Behavior normal.             An electronic signature was used to authenticate this note. --Nicolle Yeager MD       *NOTE: This report was transcribed using voice recognition software. Every effort was made to ensure accuracy; however, inadvertent computerized transcription errors may be present.

## 2021-09-16 NOTE — ASSESSMENT & PLAN NOTE
Uncontrolled, lifestyle modifications recommended patient has been through physical therapy, using cane for stability, has lost 42 pounds in order to get right hip surgery which will help with potential for falls, also decrease some of her blood pressure medications which may be causing bradycardia, reviewed home safety, follow-up in 4 weeks

## 2021-09-16 NOTE — ASSESSMENT & PLAN NOTE
Well-controlled, medication adherence emphasized and lifestyle modifications recommended patient is on multiple blood pressure medications, she was bradycardic today, will decrease carvedilol down to 12.5 and decrease verapamil down to the recommended less than 180 mg a day dosing, will get echo as patient does have some history of possible valve/heart problems and does have loud S2 today, follow-up in 4 weeks for blood pressure check and review of echo

## 2021-09-20 ENCOUNTER — TELEPHONE (OUTPATIENT)
Dept: FAMILY MEDICINE CLINIC | Age: 71
End: 2021-09-20

## 2021-09-20 NOTE — TELEPHONE ENCOUNTER
----- Message from Ayse Davis MD sent at 9/20/2021  4:00 PM EDT -----  Regarding: Medication clarification  Please call patient and have her read to you what it says on her bottle of verapamil, I wanted to order her a lower dose however I do not know what her bottle actually said, thanks

## 2021-09-21 ENCOUNTER — TELEPHONE (OUTPATIENT)
Dept: FAMILY MEDICINE CLINIC | Age: 71
End: 2021-09-21

## 2021-09-21 DIAGNOSIS — I10 ESSENTIAL HYPERTENSION: ICD-10-CM

## 2021-09-21 RX ORDER — CARVEDILOL 12.5 MG/1
12.5 TABLET ORAL 2 TIMES DAILY
Qty: 180 TABLET | Refills: 1 | Status: SHIPPED
Start: 2021-09-21 | End: 2022-01-10 | Stop reason: SDUPTHER

## 2021-09-21 NOTE — PROGRESS NOTES
Prescription sent in incorrectly to the pharmacy, corrected to twice daily, patient is on lower dose due to bradycardia

## 2021-09-21 NOTE — TELEPHONE ENCOUNTER
Pharmacy calling regarding Verapamil rx. He states this is an \"immediate relief\" med and usually is written Q6h or Q8h. He states if she takes at bedtime there in no relief during the day. He is questioning if this is still how you want rx written.  Please advise

## 2021-10-01 ENCOUNTER — TELEPHONE (OUTPATIENT)
Dept: FAMILY MEDICINE CLINIC | Age: 71
End: 2021-10-01

## 2021-10-01 NOTE — TELEPHONE ENCOUNTER
----- Message from Rhodia Lesches sent at 10/1/2021  9:22 AM EDT -----  Subject: Message to Provider    QUESTIONS  Information for Provider? Patient was seen in the office on 09/16/2021 and   her PCP Dr. Tabby Holden was going to order her a Echocardiogram for her heart. She was unsure of where she was going to be able to have the echo   preformed and she has not heard anything back from the office. She did   state she thought she was going to be able to have it done at a office in   [place]. But she was not sure. She would like to be contacted regarding   this to know if she needs to have the Echo preformed and when. She does   not have a voicemail.   ---------------------------------------------------------------------------  --------------  CALL BACK INFO  What is the best way for the office to contact you? Do not leave any   message, patient will call back for answer  Preferred Call Back Phone Number? [phone]  ---------------------------------------------------------------------------  --------------  SCRIPT ANSWERS  Relationship to Patient?  Self

## 2021-10-11 ENCOUNTER — OFFICE VISIT (OUTPATIENT)
Dept: BARIATRICS/WEIGHT MGMT | Age: 71
End: 2021-10-11
Payer: MEDICARE

## 2021-10-11 VITALS
DIASTOLIC BLOOD PRESSURE: 65 MMHG | WEIGHT: 257 LBS | TEMPERATURE: 97.2 F | SYSTOLIC BLOOD PRESSURE: 137 MMHG | HEART RATE: 58 BPM | HEIGHT: 67 IN | BODY MASS INDEX: 40.34 KG/M2

## 2021-10-11 DIAGNOSIS — E66.01 CLASS 3 SEVERE OBESITY DUE TO EXCESS CALORIES WITH SERIOUS COMORBIDITY AND BODY MASS INDEX (BMI) OF 45.0 TO 49.9 IN ADULT (HCC): ICD-10-CM

## 2021-10-11 DIAGNOSIS — G89.29 CHRONIC PAIN OF RIGHT KNEE: Primary | ICD-10-CM

## 2021-10-11 DIAGNOSIS — M25.561 CHRONIC PAIN OF RIGHT KNEE: Primary | ICD-10-CM

## 2021-10-11 PROCEDURE — G8428 CUR MEDS NOT DOCUMENT: HCPCS | Performed by: INTERNAL MEDICINE

## 2021-10-11 PROCEDURE — 1123F ACP DISCUSS/DSCN MKR DOCD: CPT | Performed by: INTERNAL MEDICINE

## 2021-10-11 PROCEDURE — 1090F PRES/ABSN URINE INCON ASSESS: CPT | Performed by: INTERNAL MEDICINE

## 2021-10-11 PROCEDURE — 3017F COLORECTAL CA SCREEN DOC REV: CPT | Performed by: INTERNAL MEDICINE

## 2021-10-11 PROCEDURE — G8417 CALC BMI ABV UP PARAM F/U: HCPCS | Performed by: INTERNAL MEDICINE

## 2021-10-11 PROCEDURE — 99211 OFF/OP EST MAY X REQ PHY/QHP: CPT

## 2021-10-11 PROCEDURE — 1036F TOBACCO NON-USER: CPT | Performed by: INTERNAL MEDICINE

## 2021-10-11 PROCEDURE — 4040F PNEUMOC VAC/ADMIN/RCVD: CPT | Performed by: INTERNAL MEDICINE

## 2021-10-11 PROCEDURE — G8484 FLU IMMUNIZE NO ADMIN: HCPCS | Performed by: INTERNAL MEDICINE

## 2021-10-11 PROCEDURE — 99213 OFFICE O/P EST LOW 20 MIN: CPT | Performed by: INTERNAL MEDICINE

## 2021-10-11 PROCEDURE — G8399 PT W/DXA RESULTS DOCUMENT: HCPCS | Performed by: INTERNAL MEDICINE

## 2021-10-11 NOTE — PROGRESS NOTES
CC -   Ortho Referral (Dr. Blake Mitchell) - preparing for TKA  Right knee pain, Obesity    BACKGROUND -   Last visit: 9/08/21  First visit: 6/23/21     Obesity   Began in childhood  Initial BMI 47.4, Wt 298.0 lbs  HS Grad wt unknown (no scale in [address] [place] [address]; weighed 200 lbs upon coming to [address] [facility] at age 24)   Lowest   wt 189 lbs  Highest  wt  303 lbs  Pattern of wt gain: grad  Wt change past yr: down 4 lbs  Most wt lost: 40 lbs (eat less/right)  Other diets attempted: none    Desire to lose weight: 10/10  Prob posed by appetite: 7-8/10    Initial Diet:    Number of meals per day - 3    Number of snacks per day - 3    Meal volume - 12\" plate, sometimes seconds    Fast food/convenience store - 0-1x/week    Restaurants (not fast food) - 0x/week   Sweets - 2d/week (one piece of cake or sweet bread)   Chips - 2d/week (1 handful of sweet potato chips)    Crackers/pretzels - 2-3d/week (4-5 saltines or multigrain)   Nuts - 1d/week   Peanut Butter - 0d/week   Popcorn - 0d/week   Dried fruit - 2-3d/week (5 prunes (23 joselito each))   Whole fruit - 7d/week (1-2 servings/day)   Breakfast cereal - 1d/week (oatmeal)   Granola/Protein/Energy bar - 0d/week   Sugar sweetened beverages - none   Protein - No supplements   Fiber - No supplements     Exercise:    Gym membership - none    Walking - none    Running - none    Resistance - none    Aerobic class - none      ______________________    [facility] -  Past Medical History:   Diagnosis Date    Arthritis     Cataract, right eye     Chronic back pain     Chronic pain of right knee     Class 3 severe obesity due to excess calories with serious comorbidity and body mass index (BMI) of 45.0 to 49.9 in adult (HCC)     Diabetes mellitus (HCC)     Hypertension      Current Outpatient Medications   Medication Sig Dispense Refill    carvedilol (COREG) 12.5 MG tablet Take 1 tablet by mouth 2 times daily 180 tablet 1    ACCU-CHEK YAAKOV PLUS strip TEST TWICE DAILY AS NEEDED      metFORMIN (GLUCOPHAGE) 500 MG tablet Take 1 tablet by mouth 2 times daily 180 tablet 1    verapamil (CALAN) 80 MG tablet Take 2 tablets by mouth nightly 180 tablet 1    valsartan-hydroCHLOROthiazide (DIOVAN-HCT) 160-25 MG per tablet Take 1 tablet by mouth nightly 90 tablet 1    Cholecalciferol (VITAMIN D3) 1000 units CAPS Take by mouth daily Instructed to hold 5 days pre-op      OMEGA-3 KRILL OIL PO Take 1 tablet by mouth daily Instructed to hold 5 days pre-op       No current facility-administered medications for this visit. PE -  Gen : /65 (Site: Left Upper Arm, Position: Sitting, Cuff Size: Thigh)   Pulse 58   Temp 97.2 °F (36.2 °C) (Temporal)   Ht 5' 6.5\" (1.689 m)   Wt 257 lb (116.6 kg)   BMI 40.86 kg/m²    Repeat /61   WN, WD, NAD  Lung: Nml resp effort  Psych: Normal mood   Full affect  Neuro: Moves all ext well  ______________________    HISTORY & ASSESSMENT/PLAN -     Problem 1  - Right knee pain  HPI   - Began age 79      Severity past week: 8/11       Sees Dr. Taran Pacheco in one week      Needs R TKA      Must first reduce BMI to 40 (wt 255 lbs); has met goal - will be seeing Dr. Taran Pacheco in November      R hip pain, past week 7/10 (this waxes and wanes)  Assessment  - Uncontrolled  Plan   - Proceed with wt reduction per the plan below    Problem 2  - Obesity   HPI   - See above Background for description    Weight  Date    298.0 lbs   6/23/21    276.8 lbs   7/29/21    262.2 lbs   9/08/21    257.0 lbs 10/11/21  Total wt loss to date: 41.0 lbs  DEN = 1850 joselito/d  Avg daily energy variance:   6/23/21-7/29/21 = - 19.4 lbs (67,900 joselito)/35d = - 1,940 joselito/d deficit   7/29/21-9/08/21 = - 14.6 lbs (51,100 joselito)/41d = - 1,246 joselito/d deficit   9/08/21-10/11/21 = - 5.2 lbs (18,200 joselito)/33d = -   551 joselito/d deficit  Update:   Following her plan 6-7d/wk (keeps calories 900-1000)  -120 mg/dl with only metformin 500 mg twice daily (no change)  Doing well without an appetite suppressant  Assessment  - Improving; cont with current plan  Plan   -   Count all calories and limit them to 900 calories per day    Use the plan below as a guide for this:    Breakfast  -   4 oz of grilled, broiled or baked turkey, chicken or fish    or 3 whole eggs with 3/4 cup of Egg Beaters (egg whites) (may add any vegetable that has <5 joselito)   or a protein shake (Premier, Pure Protein or Fairlife  Eat 22 grams of fiber (12 tablespoons of the original, plain Fiber One cereal or 4 tablespoons of wheat dextrin powder (Benefiber or generic brand); for both of these, start with 1/8th - 1/4th the target amount and every week add another 1/8th - 1/4th until reaching the target). This is for the health of the colon. Its purpose is not to prevent or treat constipation.     Lunch -   4 oz of grilled, broiled or baked turkey, chicken or fish    or 3 whole eggs with 3/4 cup of Egg Beaters (egg whites) (may add any vegetable that has <5 joselito)   or a protein shake (Premier, Pure Protein or Fairlife  Eat 1 1/2 tablespoon of oil-based dressing (such as Ranch or Caesar) or 1/4 cup of [place] dressing   or 1 and 1/2 tablespoon of peanut butter   or 22 almonds or cashews    Dinner -  Eat a 350 joselito meal (do not eat any sweets)    See me back in four to six weeks     Stanton Roth MD  Endocrinology/Obesity  10/11/21

## 2021-10-11 NOTE — PATIENT INSTRUCTIONS
Count all calories and limit them to 900 calories per day    Use the plan below as a guide for this:    Breakfast  -   4 oz of grilled, broiled or baked turkey, chicken or fish    or 3 whole eggs with 3/4 cup of Egg Beaters (egg whites) (may add any vegetable that has <5 joselito)   or a protein shake (Premier, Pure Protein or Fairlife  Eat 22 grams of fiber (12 tablespoons of the original, plain Fiber One cereal or 4 tablespoons of wheat dextrin powder (Benefiber or generic brand); for both of these, start with 1/8th - 1/4th the target amount and every week add another 1/8th - 1/4th until reaching the target). This is for the health of the colon. Its purpose is not to prevent or treat constipation.     Lunch -   4 oz of grilled, broiled or baked turkey, chicken or fish    or 3 whole eggs with 3/4 cup of Egg Beaters (egg whites) (may add any vegetable that has <5 joselito)   or a protein shake (Premier, Pure Protein or Fairlife  Eat 1 1/2 tablespoon of oil-based dressing (such as Ranch or Caesar) or 1/4 cup of [place] dressing   or 1 and 1/2 tablespoon of peanut butter   or 22 almonds or cashews    Dinner -  Eat a 350 joselito meal (do not eat any sweets)    See me back in four to six weeks

## 2021-10-29 ENCOUNTER — HOSPITAL ENCOUNTER (OUTPATIENT)
Dept: CARDIOLOGY | Age: 71
Discharge: HOME | End: 2021-10-29
Payer: MEDICARE

## 2021-10-29 DIAGNOSIS — R60.0 BILATERAL LOWER EXTREMITY EDEMA: ICD-10-CM

## 2021-10-29 DIAGNOSIS — R00.1 BRADYCARDIA: ICD-10-CM

## 2021-10-29 DIAGNOSIS — R01.2 LOUD S2 (SECOND HEART SOUND): ICD-10-CM

## 2021-10-29 LAB
LV EF: 60 %
LVEF MODALITY: NORMAL

## 2021-10-29 PROCEDURE — 93306 TTE W/DOPPLER COMPLETE: CPT

## 2021-11-10 ENCOUNTER — OFFICE VISIT (OUTPATIENT)
Dept: FAMILY MEDICINE CLINIC | Age: 71
End: 2021-11-10
Payer: MEDICARE

## 2021-11-10 VITALS
HEART RATE: 62 BPM | SYSTOLIC BLOOD PRESSURE: 105 MMHG | RESPIRATION RATE: 20 BRPM | TEMPERATURE: 97.9 F | OXYGEN SATURATION: 96 % | WEIGHT: 254.8 LBS | DIASTOLIC BLOOD PRESSURE: 67 MMHG | HEIGHT: 67 IN | BODY MASS INDEX: 39.99 KG/M2

## 2021-11-10 DIAGNOSIS — E11.9 TYPE 2 DIABETES MELLITUS WITHOUT COMPLICATION, WITHOUT LONG-TERM CURRENT USE OF INSULIN (HCC): ICD-10-CM

## 2021-11-10 DIAGNOSIS — I34.0 NONRHEUMATIC MITRAL VALVE REGURGITATION: ICD-10-CM

## 2021-11-10 DIAGNOSIS — I10 ESSENTIAL HYPERTENSION: ICD-10-CM

## 2021-11-10 DIAGNOSIS — I51.89 DIASTOLIC DYSFUNCTION: ICD-10-CM

## 2021-11-10 DIAGNOSIS — R00.1 BRADYCARDIA: ICD-10-CM

## 2021-11-10 LAB
CREATININE URINE POCT: NORMAL
MICROALBUMIN/CREAT 24H UR: NORMAL MG/G{CREAT}
MICROALBUMIN/CREAT UR-RTO: NORMAL

## 2021-11-10 PROCEDURE — 99214 OFFICE O/P EST MOD 30 MIN: CPT | Performed by: STUDENT IN AN ORGANIZED HEALTH CARE EDUCATION/TRAINING PROGRAM

## 2021-11-10 PROCEDURE — 82044 UR ALBUMIN SEMIQUANTITATIVE: CPT | Performed by: STUDENT IN AN ORGANIZED HEALTH CARE EDUCATION/TRAINING PROGRAM

## 2021-11-10 NOTE — ASSESSMENT & PLAN NOTE
Well-controlled, medication adherence emphasized and lifestyle modifications recommended patient is on multiple blood pressure medications, due to bradycardia medications were decreased at last visit, she continues to have lower blood pressures, will discontinue verapamil completely at this time, continue carvedilol to 12.5, continue the ARB hydrochlorothiazide combination, echo shows diastolic dysfunction which may be contributing to her lower extremity edema, will need blood pressure to be higher in order to start SGLT2 inhibitor which may help with the diastolic dysfunction and diabetes, patient to keep track of her blood pressures at home, goal of 110-130/70-80, she will call if values are above and below that, follow-up at next A1c check

## 2021-11-10 NOTE — ASSESSMENT & PLAN NOTE
Borderline controlled, lifestyle modifications recommended and SGLT2 inhibitor added in the future once blood pressure values have increased slightly    Echo results 10/2021  Conclusions      Summary   No significant valvular abnormalities. Normal left ventricle size. Normal left ventricle wall thickness. No wall motion abnormalities. Stage I diastolic dysfunction. Ejection fraction is visually estimated at 60%.

## 2021-11-10 NOTE — PROGRESS NOTES
Kole Miranda (:  1950) is a 70 y.o. female, established patient follow up , here for evaluation of the following:  Hypertension (patient wants to go over echo) and Other (discuss getting ultrasound of kidney)         ASSESSMENT/PLAN      1. Diastolic dysfunction  Assessment & Plan:   Borderline controlled, lifestyle modifications recommended and SGLT2 inhibitor added in the future once blood pressure values have increased slightly    Echo results 10/2021  Conclusions      Summary   No significant valvular abnormalities. Normal left ventricle size. Normal left ventricle wall thickness. No wall motion abnormalities. Stage I diastolic dysfunction. Ejection fraction is visually estimated at 60%. 2. Nonrheumatic mitral valve regurgitation  Assessment & Plan:   Unclear control, lifestyle modifications recommended     From ECHO 10/2021  Mitral Valve   Mild mitral annular calcification. Physiologic and/or trace mitral regurgitation is present. 3. Type 2 diabetes mellitus without complication, without long-term current use of insulin (HCC)  Assessment & Plan:   Well-controlled, continue current medications and continue current treatment plan patient with 54 pound weight loss likely contributing to better diabetic control, also mention nutritionist is eating appropriate meals, discussed that she no longer needs to check her blood sugars daily, can rely on A1c, did provide her with information on hypoglycemia, and she can check her blood sugars at this time and follow procedure if they are less than 70, she can continue on Metformin, sitter adding SGLT2 in the future, urine protein creatinine ratio in the office today, follow-up in 3 months    Component 11/10/21 1250   Microalb, Ur 10 mg/L    Creatinine Ur POCT 200 mg/dL    Microalbumin Creatinine Ratio <30 mg/g        Orders:  -     POCT Microalbumin  4.  Bradycardia  Assessment & Plan:   Unclear control, continue current medications and Action of medications it could possibly be contributing to bradycardia today, can consider a monitor in the future  5. Essential hypertension  Assessment & Plan:   Well-controlled, medication adherence emphasized and lifestyle modifications recommended patient is on multiple blood pressure medications, due to bradycardia medications were decreased at last visit, she continues to have lower blood pressures, will discontinue verapamil completely at this time, continue carvedilol to 12.5, continue the ARB hydrochlorothiazide combination, echo shows diastolic dysfunction which may be contributing to her lower extremity edema, will need blood pressure to be higher in order to start SGLT2 inhibitor which may help with the diastolic dysfunction and diabetes, patient to keep track of her blood pressures at home, goal of 110-130/70-80, she will call if values are above and below that, follow-up at next A1c check     Return in about 2 months (around 1/18/2022) for Diabetes follow up. 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 She does have appointment to see Ortho for her right hip and knee which prevents her from exercising. She does continue to have lower extremity edema, it is pitting up to the knees and she uses compression socks, she is concerned because some family members have had recent diagnosis of kidney cancer and now will have a nephrectomy, also has blood brother with diabetic kidney disease, she is wondering if she should get ultrasound of her kidneys.  She did have mammogram in 2019 and DEXA scan in 2019 both of which were normal    Declined preventative screening identified as care gaps unless ordered through this visit        Past Medical History:  has a past medical history of Arthritis, Cataract, right eye, Chronic back pain, Chronic pain of right knee, Class 3 severe obesity due to excess calories with serious comorbidity and body mass index (BMI) of 45.0 to 49.9 in adult [facility]), Diabetes mellitus ([facility] [address] and Hypertension. Past Surgical History:  has a past surgical history that includes  section; Colonoscopy (); back surgery (); joint replacement (Left, ); other surgical history (Left, 2017); and Cataract removal with implant (Right, 2017). Social History:  reports that she has never smoked. She has never used smokeless tobacco. She reports that she does not drink alcohol and does not use drugs. Family History: family history includes Diabetes in her brother and sister; Stroke in her brother. Allergies: Codeine and Vicodin [hydrocodone-acetaminophen]  Medications:   Current Outpatient Medications   Medication Sig Dispense Refill    carvedilol (COREG) 12.5 MG tablet Take 1 tablet by mouth 2 times daily 180 tablet 1    ACCU-CHEK YAAKOV PLUS strip TEST TWICE DAILY AS NEEDED      metFORMIN (GLUCOPHAGE) 500 MG tablet Take 1 tablet by mouth 2 times daily 180 tablet 1    valsartan-hydroCHLOROthiazide (DIOVAN-HCT) 160-25 MG per tablet Take 1 tablet by mouth nightly 90 tablet 1    Cholecalciferol (VITAMIN D3) 1000 units CAPS Take by mouth daily Instructed to hold 5 days pre-op      OMEGA-3 KRILL OIL PO Take 1 tablet by mouth daily Instructed to hold 5 days pre-op       No current facility-administered medications for this visit.       Allergies: Codeine and Vicodin [hydrocodone-acetaminophen]     Review of Systems       Objective   /67 (Site: Right Upper Arm, Position: Sitting, Cuff Size: Large Adult)   Pulse 62   Temp 97.9 °F (36.6 °C) (Temporal)   Resp 20   Ht 5' 6.5\" (1.689 m)   Wt 254 lb 12.8 oz (115.6 kg)   SpO2 96%   BMI 40.51 kg/m²       Lab Results   Component Value Date    LABA1C 5.9 09/16/2021    LABA1C 5.9 04/13/2021    LABA1C 6.5 11/11/2020       Physical Exam        An electronic signature was used to authenticate this note. --Miguel Aguilar MD       *NOTE: This report was transcribed using voice recognition software. Every effort was made to ensure accuracy; however, inadvertent computerized transcription errors may be present.

## 2021-11-10 NOTE — ASSESSMENT & PLAN NOTE
Unclear control, lifestyle modifications recommended     From ECHO 10/2021  Mitral Valve   Mild mitral annular calcification. Physiologic and/or trace mitral regurgitation is present.

## 2021-11-10 NOTE — ASSESSMENT & PLAN NOTE
Well-controlled, continue current medications and continue current treatment plan patient with 54 pound weight loss likely contributing to better diabetic control, also mention nutritionist is eating appropriate meals, discussed that she no longer needs to check her blood sugars daily, can rely on A1c, did provide her with information on hypoglycemia, and she can check her blood sugars at this time and follow procedure if they are less than 70, she can continue on Metformin, sitter adding SGLT2 in the future, urine protein creatinine ratio in the office today, follow-up in 3 months    Component 11/10/21 1250   Microalb, Ur 10 mg/L    Creatinine Ur POCT 200 mg/dL    Microalbumin Creatinine Ratio <30 mg/g

## 2021-12-08 ENCOUNTER — OFFICE VISIT (OUTPATIENT)
Dept: BARIATRICS/WEIGHT MGMT | Age: 71
End: 2021-12-08
Payer: MEDICARE

## 2021-12-08 VITALS
BODY MASS INDEX: 38.55 KG/M2 | SYSTOLIC BLOOD PRESSURE: 132 MMHG | HEART RATE: 63 BPM | DIASTOLIC BLOOD PRESSURE: 75 MMHG | HEIGHT: 67 IN | WEIGHT: 245.6 LBS | TEMPERATURE: 96.6 F

## 2021-12-08 DIAGNOSIS — M25.561 CHRONIC PAIN OF RIGHT KNEE: Primary | ICD-10-CM

## 2021-12-08 DIAGNOSIS — E66.01 CLASS 3 SEVERE OBESITY DUE TO EXCESS CALORIES WITH SERIOUS COMORBIDITY AND BODY MASS INDEX (BMI) OF 45.0 TO 49.9 IN ADULT (HCC): ICD-10-CM

## 2021-12-08 DIAGNOSIS — G89.29 CHRONIC PAIN OF RIGHT KNEE: Primary | ICD-10-CM

## 2021-12-08 PROCEDURE — 1123F ACP DISCUSS/DSCN MKR DOCD: CPT | Performed by: INTERNAL MEDICINE

## 2021-12-08 PROCEDURE — 3017F COLORECTAL CA SCREEN DOC REV: CPT | Performed by: INTERNAL MEDICINE

## 2021-12-08 PROCEDURE — 4040F PNEUMOC VAC/ADMIN/RCVD: CPT | Performed by: INTERNAL MEDICINE

## 2021-12-08 PROCEDURE — G8484 FLU IMMUNIZE NO ADMIN: HCPCS | Performed by: INTERNAL MEDICINE

## 2021-12-08 PROCEDURE — G8399 PT W/DXA RESULTS DOCUMENT: HCPCS | Performed by: INTERNAL MEDICINE

## 2021-12-08 PROCEDURE — 99211 OFF/OP EST MAY X REQ PHY/QHP: CPT

## 2021-12-08 PROCEDURE — G8417 CALC BMI ABV UP PARAM F/U: HCPCS | Performed by: INTERNAL MEDICINE

## 2021-12-08 PROCEDURE — G8428 CUR MEDS NOT DOCUMENT: HCPCS | Performed by: INTERNAL MEDICINE

## 2021-12-08 PROCEDURE — 1090F PRES/ABSN URINE INCON ASSESS: CPT | Performed by: INTERNAL MEDICINE

## 2021-12-08 PROCEDURE — 99213 OFFICE O/P EST LOW 20 MIN: CPT | Performed by: INTERNAL MEDICINE

## 2021-12-08 PROCEDURE — 1036F TOBACCO NON-USER: CPT | Performed by: INTERNAL MEDICINE

## 2021-12-08 NOTE — PROGRESS NOTES
CC -   Ortho Referral (Dr. Whitney Barry) - preparing for TKA  Right knee pain, Obesity    BACKGROUND -   Last visit: 10/11/21  First visit:   6/23/21     Obesity   Began in childhood  Initial BMI 47.4, Wt 298.0 lbs  HS Grad wt unknown (no scale in [address] [place] [address]; weighed 200 lbs upon coming to [address] [facility] at age 24)   Lowest   wt 189 lbs  Highest  wt  303 lbs  Pattern of wt gain: grad  Wt change past yr: down 4 lbs  Most wt lost: 40 lbs (eat less/right)  Other diets attempted: none    Desire to lose weight: 10/10  Prob posed by appetite: 7-8/10    Initial Diet:    Number of meals per day - 3    Number of snacks per day - 3    Meal volume - 12\" plate, sometimes seconds    Fast food/convenience store - 0-1x/week    Restaurants (not fast food) - 0x/week   Sweets - 2d/week (one piece of cake or sweet bread)   Chips - 2d/week (1 handful of sweet potato chips)    Crackers/pretzels - 2-3d/week (4-5 saltines or multigrain)   Nuts - 1d/week   Peanut Butter - 0d/week   Popcorn - 0d/week   Dried fruit - 2-3d/week (5 prunes (23 joselito each))   Whole fruit - 7d/week (1-2 servings/day)   Breakfast cereal - 1d/week (oatmeal)   Granola/Protein/Energy bar - 0d/week   Sugar sweetened beverages - none   Protein - No supplements   Fiber - No supplements     Exercise:    Gym membership - none    Walking - none    Running - none    Resistance - none    Aerobic class - none      ______________________    [facility] -  Past Medical History:   Diagnosis Date    Arthritis     Cataract, right eye     Chronic back pain     Chronic pain of right knee     Class 3 severe obesity due to excess calories with serious comorbidity and body mass index (BMI) of 45.0 to 49.9 in adult (HCC)     Diabetes mellitus (HCC)     Hypertension      Current Outpatient Medications   Medication Sig Dispense Refill    carvedilol (COREG) 12.5 MG tablet Take 1 tablet by mouth 2 times daily 180 tablet 1    ACCU-CHEK YAAKOV PLUS strip TEST TWICE DAILY AS NEEDED      metFORMIN (GLUCOPHAGE) 500 MG tablet Take 1 tablet by mouth 2 times daily 180 tablet 1    valsartan-hydroCHLOROthiazide (DIOVAN-HCT) 160-25 MG per tablet Take 1 tablet by mouth nightly 90 tablet 1    Cholecalciferol (VITAMIN D3) 1000 units CAPS Take by mouth daily Instructed to hold 5 days pre-op      OMEGA-3 KRILL OIL PO Take 1 tablet by mouth daily Instructed to hold 5 days pre-op       No current facility-administered medications for this visit. PE -  Gen : /75 (Site: Left Upper Arm, Position: Sitting, Cuff Size: Large Adult)   Pulse 63   Temp 96.6 °F (35.9 °C) (Temporal)   Ht 5' 6.5\" (1.689 m)   Wt 245 lb 9.6 oz (111.4 kg)   BMI 39.05 kg/m²     WN, WD, NAD  Lung: Nml resp effort  Psych: Normal mood   Full affect  Neuro: Moves all ext well  ______________________    HISTORY & ASSESSMENT/PLAN -     Problem 1  - Right knee pain  HPI   - Began age 79      Severity past week: 5/10 (no change, worse with walking)       Needs R TKA      Must first reduce BMI to 40 (wt 255 lbs); has met goal - will be seeing Dr. Tl Vázquez in November      R hip pain, past week 7/10 (this waxes and wanes)  Assessment  - Uncontrolled  Plan   - Proceed with wt reduction per the plan below    Problem 2  - Obesity   HPI   - See above Background for description    Weight  Date    298.0 lbs   6/23/21    276.8 lbs   7/29/21    262.2 lbs   9/08/21    257.0 lbs 10/11/21    245.6 lbs 12/08/21  Total wt loss to date: 52.4 lbs  DEN = 1850 joselito/d  Avg daily energy variance:   06/23/21-07/29/21 = - 19.4 lbs (67,900 joselito)/35d = - 1,940 joselito/d deficit   07/29/21-09/08/21 = - 14.6 lbs (51,100 joselito)/41d = - 1,246 joselito/d deficit   09/08/21-10/11/21 = - 5.2 lbs   (18,200 joselito)/33d = -   551 joselito/d deficit   10/11/21-12/08/21 = - 11.4 lbs (39,900 joselito)/59d = -   676 joselito/d deficit  Update:   Following her plan 6-7d/wk (keeps calories 900-1000)  -120 mg/dl with only metformin 500 mg twice daily (again no change)  Cont's to do well without an appetite suppressant  Assessment  - Improving; cont with current plan  Plan   -   Count all calories and limit them to 900 calories per day    Use the plan below as a guide for this:    Breakfast  -   4 oz of grilled, broiled or baked turkey, chicken or fish    or 3 whole eggs with 3/4 cup of Egg Beaters (egg whites) (may add any vegetable that has <5 joselito)   or a protein shake (Premier, Pure Protein or Fairlife  Eat 22 grams of fiber (12 tablespoons of the original, plain Fiber One cereal or 4 tablespoons of wheat dextrin powder (Benefiber or generic brand); for both of these, start with 1/8th - 1/4th the target amount and every week add another 1/8th - 1/4th until reaching the target). This is for the health of the colon. Its purpose is not to prevent or treat constipation.     Lunch -   4 oz of grilled, broiled or baked turkey, chicken or fish    or 3 whole eggs with 3/4 cup of Egg Beaters (egg whites) (may add any vegetable that has <5 joselito)   or a protein shake (Premier, Pure Protein or Fairlife  Eat 1 1/2 tablespoon of oil-based dressing (such as Ranch or Caesar) or 1/4 cup of [place] dressing   or 1 and 1/2 tablespoon of peanut butter   or 22 almonds or cashews    Dinner -  Eat a 350 joselito meal (do not eat any sweets)    See me back in 5-7 weeks     Shon Motta MD  Endocrinology/Obesity  12/8/21

## 2021-12-08 NOTE — PATIENT INSTRUCTIONS
Count all calories and limit them to 900 calories per day    Use the plan below as a guide for this:    Breakfast  -   4 oz of grilled, broiled or baked turkey, chicken or fish    or 3 whole eggs with 3/4 cup of Egg Beaters (egg whites) (may add any vegetable that has <5 joselito)   or a protein shake (Premier, Pure Protein or Fairlife  Eat 22 grams of fiber (12 tablespoons of the original, plain Fiber One cereal or 4 tablespoons of wheat dextrin powder (Benefiber or generic brand); for both of these, start with 1/8th - 1/4th the target amount and every week add another 1/8th - 1/4th until reaching the target). This is for the health of the colon. Its purpose is not to prevent or treat constipation.     Lunch -   4 oz of grilled, broiled or baked turkey, chicken or fish    or 3 whole eggs with 3/4 cup of Egg Beaters (egg whites) (may add any vegetable that has <5 joselito)   or a protein shake (Premier, Pure Protein or Fairlife  Eat 1 1/2 tablespoon of oil-based dressing (such as Ranch or Caesar) or 1/4 cup of [place] dressing   or 1 and 1/2 tablespoon of peanut butter   or 22 almonds or cashews    Dinner -  Eat a 350 joselito meal (do not eat any sweets)    See me back in 5-7 weeks

## 2022-01-10 ENCOUNTER — HOSPITAL ENCOUNTER (OUTPATIENT)
Age: 72
Discharge: HOME | End: 2022-01-10
Payer: MEDICARE

## 2022-01-10 ENCOUNTER — OFFICE VISIT (OUTPATIENT)
Dept: FAMILY MEDICINE CLINIC | Age: 72
End: 2022-01-10
Payer: MEDICARE

## 2022-01-10 VITALS
HEIGHT: 67 IN | OXYGEN SATURATION: 97 % | BODY MASS INDEX: 38.01 KG/M2 | DIASTOLIC BLOOD PRESSURE: 78 MMHG | WEIGHT: 242.2 LBS | TEMPERATURE: 97.4 F | SYSTOLIC BLOOD PRESSURE: 163 MMHG | RESPIRATION RATE: 20 BRPM | HEART RATE: 58 BPM

## 2022-01-10 DIAGNOSIS — I10 ESSENTIAL HYPERTENSION: ICD-10-CM

## 2022-01-10 DIAGNOSIS — R60.0 BILATERAL LOWER EXTREMITY EDEMA: ICD-10-CM

## 2022-01-10 DIAGNOSIS — E11.9 TYPE 2 DIABETES MELLITUS WITHOUT COMPLICATION, WITHOUT LONG-TERM CURRENT USE OF INSULIN (HCC): Primary | ICD-10-CM

## 2022-01-10 DIAGNOSIS — R00.1 BRADYCARDIA: ICD-10-CM

## 2022-01-10 DIAGNOSIS — Z12.31 ENCOUNTER FOR SCREENING MAMMOGRAM FOR MALIGNANT NEOPLASM OF BREAST: ICD-10-CM

## 2022-01-10 DIAGNOSIS — Z01.818 PRE-OP EXAMINATION: ICD-10-CM

## 2022-01-10 DIAGNOSIS — Z12.11 SCREENING FOR COLON CANCER: ICD-10-CM

## 2022-01-10 DIAGNOSIS — E66.01 CLASS 3 SEVERE OBESITY DUE TO EXCESS CALORIES WITH SERIOUS COMORBIDITY AND BODY MASS INDEX (BMI) OF 45.0 TO 49.9 IN ADULT (HCC): ICD-10-CM

## 2022-01-10 LAB
ALBUMIN SERPL-MCNC: 4 G/DL (ref 3.5–5.2)
ALP BLD-CCNC: 60 U/L (ref 35–104)
ALT SERPL-CCNC: 10 U/L (ref 0–32)
ANION GAP SERPL CALCULATED.3IONS-SCNC: 11 MMOL/L (ref 7–16)
AST SERPL-CCNC: 14 U/L (ref 0–31)
BASOPHILS ABSOLUTE: 0.06 E9/L (ref 0–0.2)
BASOPHILS RELATIVE PERCENT: 0.9 % (ref 0–2)
BILIRUB SERPL-MCNC: 0.4 MG/DL (ref 0–1.2)
BUN BLDV-MCNC: 26 MG/DL (ref 6–23)
CALCIUM SERPL-MCNC: 10 MG/DL (ref 8.6–10.2)
CHLORIDE BLD-SCNC: 101 MMOL/L (ref 98–107)
CO2: 27 MMOL/L (ref 22–29)
CREAT SERPL-MCNC: 0.7 MG/DL (ref 0.5–1)
EOSINOPHILS ABSOLUTE: 0.15 E9/L (ref 0.05–0.5)
EOSINOPHILS RELATIVE PERCENT: 2.2 % (ref 0–6)
GFR AFRICAN AMERICAN: >60
GFR NON-AFRICAN AMERICAN: >60 ML/MIN/1.73
GLUCOSE BLD-MCNC: 106 MG/DL (ref 74–99)
HBA1C MFR BLD: 6 %
HCT VFR BLD CALC: 46.5 % (ref 34–48)
HEMOGLOBIN: 15.5 G/DL (ref 11.5–15.5)
IMMATURE GRANULOCYTES #: 0.01 E9/L
IMMATURE GRANULOCYTES %: 0.1 % (ref 0–5)
LYMPHOCYTES ABSOLUTE: 2.36 E9/L (ref 1.5–4)
LYMPHOCYTES RELATIVE PERCENT: 35.4 % (ref 20–42)
MCH RBC QN AUTO: 30.9 PG (ref 26–35)
MCHC RBC AUTO-ENTMCNC: 33.3 % (ref 32–34.5)
MCV RBC AUTO: 92.6 FL (ref 80–99.9)
MONOCYTES ABSOLUTE: 0.41 E9/L (ref 0.1–0.95)
MONOCYTES RELATIVE PERCENT: 6.1 % (ref 2–12)
NEUTROPHILS ABSOLUTE: 3.68 E9/L (ref 1.8–7.3)
NEUTROPHILS RELATIVE PERCENT: 55.3 % (ref 43–80)
PDW BLD-RTO: 13.2 FL (ref 11.5–15)
PLATELET # BLD: 219 E9/L (ref 130–450)
PMV BLD AUTO: 10.2 FL (ref 7–12)
POTASSIUM SERPL-SCNC: 3.9 MMOL/L (ref 3.5–5)
RBC # BLD: 5.02 E12/L (ref 3.5–5.5)
SODIUM BLD-SCNC: 139 MMOL/L (ref 132–146)
TOTAL PROTEIN: 7.2 G/DL (ref 6.4–8.3)
WBC # BLD: 6.7 E9/L (ref 4.5–11.5)

## 2022-01-10 PROCEDURE — G8399 PT W/DXA RESULTS DOCUMENT: HCPCS | Performed by: STUDENT IN AN ORGANIZED HEALTH CARE EDUCATION/TRAINING PROGRAM

## 2022-01-10 PROCEDURE — 4040F PNEUMOC VAC/ADMIN/RCVD: CPT | Performed by: STUDENT IN AN ORGANIZED HEALTH CARE EDUCATION/TRAINING PROGRAM

## 2022-01-10 PROCEDURE — G8427 DOCREV CUR MEDS BY ELIG CLIN: HCPCS | Performed by: STUDENT IN AN ORGANIZED HEALTH CARE EDUCATION/TRAINING PROGRAM

## 2022-01-10 PROCEDURE — 80053 COMPREHEN METABOLIC PANEL: CPT

## 2022-01-10 PROCEDURE — 93000 ELECTROCARDIOGRAM COMPLETE: CPT | Performed by: STUDENT IN AN ORGANIZED HEALTH CARE EDUCATION/TRAINING PROGRAM

## 2022-01-10 PROCEDURE — 99214 OFFICE O/P EST MOD 30 MIN: CPT | Performed by: STUDENT IN AN ORGANIZED HEALTH CARE EDUCATION/TRAINING PROGRAM

## 2022-01-10 PROCEDURE — 36415 COLL VENOUS BLD VENIPUNCTURE: CPT

## 2022-01-10 PROCEDURE — G8484 FLU IMMUNIZE NO ADMIN: HCPCS | Performed by: STUDENT IN AN ORGANIZED HEALTH CARE EDUCATION/TRAINING PROGRAM

## 2022-01-10 PROCEDURE — 1090F PRES/ABSN URINE INCON ASSESS: CPT | Performed by: STUDENT IN AN ORGANIZED HEALTH CARE EDUCATION/TRAINING PROGRAM

## 2022-01-10 PROCEDURE — 1036F TOBACCO NON-USER: CPT | Performed by: STUDENT IN AN ORGANIZED HEALTH CARE EDUCATION/TRAINING PROGRAM

## 2022-01-10 PROCEDURE — 85025 COMPLETE CBC W/AUTO DIFF WBC: CPT

## 2022-01-10 PROCEDURE — 2022F DILAT RTA XM EVC RTNOPTHY: CPT | Performed by: STUDENT IN AN ORGANIZED HEALTH CARE EDUCATION/TRAINING PROGRAM

## 2022-01-10 PROCEDURE — 3044F HG A1C LEVEL LT 7.0%: CPT | Performed by: STUDENT IN AN ORGANIZED HEALTH CARE EDUCATION/TRAINING PROGRAM

## 2022-01-10 PROCEDURE — G8417 CALC BMI ABV UP PARAM F/U: HCPCS | Performed by: STUDENT IN AN ORGANIZED HEALTH CARE EDUCATION/TRAINING PROGRAM

## 2022-01-10 PROCEDURE — 3017F COLORECTAL CA SCREEN DOC REV: CPT | Performed by: STUDENT IN AN ORGANIZED HEALTH CARE EDUCATION/TRAINING PROGRAM

## 2022-01-10 PROCEDURE — 83036 HEMOGLOBIN GLYCOSYLATED A1C: CPT | Performed by: STUDENT IN AN ORGANIZED HEALTH CARE EDUCATION/TRAINING PROGRAM

## 2022-01-10 PROCEDURE — 1123F ACP DISCUSS/DSCN MKR DOCD: CPT | Performed by: STUDENT IN AN ORGANIZED HEALTH CARE EDUCATION/TRAINING PROGRAM

## 2022-01-10 RX ORDER — CARVEDILOL 6.25 MG/1
6.25 TABLET ORAL 2 TIMES DAILY
Qty: 180 TABLET | Refills: 1
Start: 2022-01-10 | End: 2022-04-20 | Stop reason: SDUPTHER

## 2022-01-10 RX ORDER — VERAPAMIL HYDROCHLORIDE 80 MG/1
80 TABLET ORAL DAILY
Qty: 90 TABLET | Refills: 3
Start: 2022-01-10 | End: 2022-02-15 | Stop reason: SDUPTHER

## 2022-01-10 ASSESSMENT — ENCOUNTER SYMPTOMS
ABDOMINAL DISTENTION: 0
SHORTNESS OF BREATH: 0
ABDOMINAL PAIN: 0
WHEEZING: 0
COUGH: 0

## 2022-01-10 NOTE — ASSESSMENT & PLAN NOTE
Borderline controlled, lifestyle modifications recommended continue compression socks, echo with diastolic dysfunction, on chart review labs show normal CMP and TSH can consider SGLT2 in the future to help with edema and diastolic dysfunction, follow-up in 3 months

## 2022-01-10 NOTE — ASSESSMENT & PLAN NOTE
Uncontrolled, lifestyle modifications recommended patient with 20 lb weigh loss, encourgaed continued heathy changes

## 2022-01-10 NOTE — ASSESSMENT & PLAN NOTE
Borderline controlled, changes made today: Restart the verapamil 80 mg in the morning, decrease the carvedilol to 6.25 continue to try to adjust medications to balance hypertension and bradycardia, she did have bradycardia today on EKG at 55, asymptomatic, further changes made to decrease the carvedilol to 6.25, and restart the verapamil as she notes this is helped her with her blood pressure in the past and they have been high at home, continue to check blood pressure at home with goal of less than 140 over less than 85, call if blood pressure is elevated over these values or if symptomatic follow-up in 3 months

## 2022-01-10 NOTE — PROGRESS NOTES
Gina Donaldson (:  1950) is a 70 y.o. female, established patient follow up , here for evaluation of the following:  Diabetes and Pre-op Exam         ASSESSMENT/PLAN      1. Type 2 diabetes mellitus without complication, without long-term current use of insulin (HCC)  Assessment & Plan:   Well-controlled, continue current medications and continue current treatment plan, follow-up in 3 months    Orders:  -     POCT glycosylated hemoglobin (Hb A1C)  -      DIABETES FOOT EXAM  2. Essential hypertension  Assessment & Plan:   Borderline controlled, changes made today: Restart the verapamil 80 mg in the morning, decrease the carvedilol to 6.25 continue to try to adjust medications to balance hypertension and bradycardia, she did have bradycardia today on EKG at 55, asymptomatic, further changes made to decrease the carvedilol to 6.25, and restart the verapamil as she notes this is helped her with her blood pressure in the past and they have been high at home, continue to check blood pressure at home with goal of less than 140 over less than 85, call if blood pressure is elevated over these values or if symptomatic follow-up in 3 months  Orders:  -     verapamil (CALAN) 80 MG tablet; Take 1 tablet by mouth daily, Disp-90 tablet, R-3Adjust Sig  -     Comprehensive Metabolic Panel; Future  -     CBC Auto Differential; Future  -     carvedilol (COREG) 6.25 MG tablet; Take 1 tablet by mouth 2 times daily, Disp-180 tablet, R-1Adjust Sig  3. Bradycardia  Assessment & Plan:   Unclear control, changes made today: decrease BB,   4. Bilateral lower extremity edema  Assessment & Plan:   Borderline controlled, lifestyle modifications recommended continue compression socks, echo with diastolic dysfunction, on chart review labs show normal CMP and TSH can consider SGLT2 in the future to help with edema and diastolic dysfunction, follow-up in 3 months  5.  Encounter for screening mammogram for malignant neoplasm of breast  - RANDAL DIGITAL SCREEN BILATERAL PER PROTOCOL; Future  6. Screening for colon cancer  7. Class 3 severe obesity due to excess calories with serious comorbidity and body mass index (BMI) of 45.0 to 49.9 in adult [facility])  Assessment & Plan:   Uncontrolled, lifestyle modifications recommended patient with 20 lb weigh loss, encourgaed continued heathy changes   8. Pre-op examination  Assessment & Plan:   Well-controlled, right hip surgery 2/2/2022 The patient is low risk for surgery per Revised Cradiac Index she is class 1, low risk. Benefits likely outweigh risks. CBC, CMP, EKG ordered per form from [facility]. EKG showing bradycardia dn beta blocker decreased. Copy fo FELICITAS and echo will be sent with this note to surgeon. No further testing required. The following was reviewed with the patient   - Understanding of the surgery being recommended benefits and risks  - Symptoms requiring the surgery  - Understanding of testing leading up to surgery why they were done and what they showed  - What options were considered and understanding of outcomes, recommendations, risks, benefits   - Risks of the surgery and patients individual risk   - Medication changes for preop, postop   - How to reduce post op complications, prehabilitation   - What will be different in the way they function after the surgery     Orders:  -     EKG 12 Lead    Return in about 3 months (around 4/10/2022) for Diabetes follow up. Subjective   SUBJECTIVE/OBJECTIVE:  HPI    Patient is down a total of 58 pounds through weight management    Needs clearance for right hip surgery. Needs EKG, BMP, CBC. He has a form to fill out. She had echo recently. Nondeformed, is able to do all her daily activities without any shortness of breath, chest pain, diaphoresis, nausea, pain in her neck or arm, has not had a stress test in the past.  No EKG in the record.     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 She does have appointment to see Ortho for her right hip and knee which prevents her from exercising. She does continue to have lower extremity edema, it is pitting up to the knees and she uses compression socks, she is concerned because some family members have had recent diagnosis of kidney cancer and now will have a nephrectomy, also has blood brother with diabetic kidney disease, she is wondering if she should get ultrasound of her kidneys. She did have mammogram in 2019 and DEXA scan in 2019 both of which were normal    Declined preventative screening identified as care gaps unless ordered through this visit        Past Medical History:  has a past medical history of Arthritis, Cataract, right eye, Chronic back pain, Chronic pain of right knee, Class 3 severe obesity due to excess calories with serious comorbidity and body mass index (BMI) of 45.0 to 49.9 in adult [facility]), Diabetes mellitus ([facility] [address] and Hypertension. Past Surgical History:  has a past surgical history that includes  section; Colonoscopy (); back surgery (); joint replacement (Left, ); other surgical history (Left, 2017); and Cataract removal with implant (Right, 2017). Social History:  reports that she has never smoked. She has never used smokeless tobacco. She reports that she does not drink alcohol and does not use drugs. Family History: family history includes Diabetes in her brother and sister; Stroke in her brother.   Allergies: Codeine and Vicodin [hydrocodone-acetaminophen]  Medications:   Current Outpatient Medications   Medication Sig Dispense Refill    verapamil (CALAN) 80 MG tablet Take 1 tablet by mouth daily 90 tablet 3    carvedilol (COREG) 6.25 MG tablet Take 1 tablet by mouth 2 times daily 180 tablet 1    ACCU-CHEK YAAKOV PLUS strip TEST TWICE DAILY AS NEEDED      metFORMIN (GLUCOPHAGE) 500 MG tablet Take 1 tablet by mouth 2 times daily 180 tablet 1    valsartan-hydroCHLOROthiazide (DIOVAN-HCT) 160-25 MG per tablet Take 1 tablet by mouth nightly 90 tablet 1    Cholecalciferol (VITAMIN D3) 1000 units CAPS Take by mouth daily Instructed to hold 5 days pre-op      OMEGA-3 KRILL OIL PO Take 1 tablet by mouth daily Instructed to hold 5 days pre-op       No current facility-administered medications for this visit. Allergies: Codeine and Vicodin [hydrocodone-acetaminophen]     Review of Systems   Constitutional: Negative for chills, fatigue, fever and unexpected weight change. Respiratory: Negative for cough, shortness of breath and wheezing. Cardiovascular: Negative for chest pain, palpitations and leg swelling. Gastrointestinal: Negative for abdominal distention and abdominal pain. Genitourinary: Negative for dysuria. Musculoskeletal: Negative for arthralgias. Neurological: Negative for weakness, light-headedness, numbness and headaches. All other systems reviewed and are negative. Objective   BP (!) 163/78 (Site: Left Upper Arm, Position: Sitting, Cuff Size: Large Adult)   Pulse 58   Temp 97.4 °F (36.3 °C) (Temporal)   Resp 20   Ht 5' 6.5\" (1.689 m)   Wt 242 lb 3.2 oz (109.9 kg)   SpO2 97%   BMI 38.51 kg/m²       Lab Results   Component Value Date    LABA1C 6.0 01/10/2022    LABA1C 5.9 09/16/2021    LABA1C 5.9 04/13/2021       Physical Exam  Constitutional:       General: She is not in acute distress. Appearance: Normal appearance. HENT:      Head: Normocephalic and atraumatic. Right Ear: External ear normal.      Nose: Nose normal.      Mouth/Throat:      Mouth: Mucous membranes are moist.   Eyes:      Extraocular Movements: Extraocular movements intact.       Conjunctiva/sclera: Conjunctivae normal.   Cardiovascular:      Rate and Rhythm: Normal rate and regular rhythm. Pulses: Normal pulses. Heart sounds: No murmur heard. Pulmonary:      Effort: Pulmonary effort is normal.      Breath sounds: Normal breath sounds. No wheezing. Musculoskeletal:         General: Normal range of motion. Right lower leg: No edema. Left lower leg: No edema. Neurological:      Mental Status: She is alert. Psychiatric:         Mood and Affect: Mood normal.         Behavior: Behavior normal.     Diabetic foot exam:     Left Foot:   Visual Exam: normal   Pulse DP: 2+ (normal)   Filament test: normal sensation        Right Foot:   Visual Exam: normal   Pulse DP: 2+ (normal)   Filament test: normal sensation              An electronic signature was used to authenticate this note. --Fanny Vanegas MD       *NOTE: This report was transcribed using voice recognition software. Every effort was made to ensure accuracy; however, inadvertent computerized transcription errors may be present.

## 2022-01-10 NOTE — ASSESSMENT & PLAN NOTE
Well-controlled, continue current medications and continue current treatment plan, follow-up in 3 months Communicable/Infectious

## 2022-01-10 NOTE — ASSESSMENT & PLAN NOTE
Well-controlled, right hip surgery 2/2/2022 The patient is low risk for surgery per Revised Cradiac Index she is class 1, low risk. Benefits likely outweigh risks. CBC, CMP, EKG ordered per form from [facility]. EKG showing bradycardia dn beta blocker decreased. Copy fo FELICITAS and echo will be sent with this note to surgeon. No further testing required.   The following was reviewed with the patient   - Understanding of the surgery being recommended benefits and risks  - Symptoms requiring the surgery  - Understanding of testing leading up to surgery why they were done and what they showed  - What options were considered and understanding of outcomes, recommendations, risks, benefits   - Risks of the surgery and patients individual risk   - Medication changes for preop, postop   - How to reduce post op complications, prehabilitation   - What will be different in the way they function after the surgery

## 2022-01-24 ENCOUNTER — TELEPHONE (OUTPATIENT)
Dept: FAMILY MEDICINE CLINIC | Age: 72
End: 2022-01-24

## 2022-01-24 NOTE — TELEPHONE ENCOUNTER
----- Message from Soo Lombardi MD sent at 1/10/2022 12:33 PM EST -----  Regarding: heart rate and BP  Please call patient and see where her last few blood pressures and heart rates have been

## 2022-01-24 NOTE — TELEPHONE ENCOUNTER
Pt called stated she has been taking BP, but not writing them down. SHe stated they have been running high, but unable to tell me numbers. Pt stated today her BP was good at 148/82. Pt instructed to take BP BID and HR, keep record and call office back on Friday to report to PCP.   Pt voiced understanding

## 2022-01-26 DIAGNOSIS — I10 ESSENTIAL HYPERTENSION: ICD-10-CM

## 2022-01-26 RX ORDER — VALSARTAN AND HYDROCHLOROTHIAZIDE 160; 25 MG/1; MG/1
TABLET ORAL
Qty: 90 TABLET | Refills: 3 | Status: SHIPPED | OUTPATIENT
Start: 2022-01-26

## 2022-01-27 ENCOUNTER — TELEPHONE (OUTPATIENT)
Dept: BARIATRICS/WEIGHT MGMT | Age: 72
End: 2022-01-27

## 2022-01-28 ENCOUNTER — TELEPHONE (OUTPATIENT)
Dept: FAMILY MEDICINE CLINIC | Age: 72
End: 2022-01-28

## 2022-01-28 LAB — DIABETIC RETINOPATHY: NEGATIVE

## 2022-01-28 NOTE — TELEPHONE ENCOUNTER
----- Message from Jethro White sent at 1/28/2022 10:09 AM EST -----  Subject: Message to Provider    QUESTIONS  Information for Provider? Ashley Moreno was to call in about her pulse & blood   pressure, The highest her pulse was this week was 67 and her BP was 137/78   & 160/89. Please let her know if she needs to do anything. Please call in   afternoon if needed  ---------------------------------------------------------------------------  --------------  CALL BACK INFO  What is the best way for the office to contact you? OK to leave message on   voicemail  Preferred Call Back Phone Number? [phone]  ---------------------------------------------------------------------------  --------------  SCRIPT ANSWERS  Relationship to Patient?  Self

## 2022-01-28 NOTE — TELEPHONE ENCOUNTER
Please call patient and let her know that she can just continue to monitor her blood pressure for the next few weeks and her heart rate, no changes in medications at this time

## 2022-01-28 NOTE — TELEPHONE ENCOUNTER
Please call her back and see if she can just keep track of her blood pressures and heart rates once a day for the next few weeks so we can get a good average, no changes in medications right now

## 2022-01-28 NOTE — TELEPHONE ENCOUNTER
----- Message from [employer] sent at 1/28/2022 10:09 AM EST -----  Subject: Message to Provider    QUESTIONS  Information for Provider? Zehra Benitez was to call in about her pulse & blood   pressure, The highest her pulse was this week was 67 and her BP was 137/78   & 160/89. Please let her know if she needs to do anything. Please call in   afternoon if needed  ---------------------------------------------------------------------------  --------------  CALL BACK INFO  What is the best way for the office to contact you? OK to leave message on   voicemail  Preferred Call Back Phone Number? [phone]  ---------------------------------------------------------------------------  --------------  SCRIPT ANSWERS  Relationship to Patient?  Self

## 2022-01-31 ENCOUNTER — HOSPITAL ENCOUNTER (OUTPATIENT)
Age: 72
Discharge: HOME | End: 2022-02-02

## 2022-01-31 LAB
ABO/RH: NORMAL
ANTIBODY SCREEN: NORMAL

## 2022-01-31 PROCEDURE — 86900 BLOOD TYPING SEROLOGIC ABO: CPT

## 2022-01-31 PROCEDURE — 86850 RBC ANTIBODY SCREEN: CPT

## 2022-01-31 PROCEDURE — 87081 CULTURE SCREEN ONLY: CPT

## 2022-01-31 PROCEDURE — 86901 BLOOD TYPING SEROLOGIC RH(D): CPT

## 2022-02-02 LAB — MRSA CULTURE ONLY: NORMAL

## 2022-02-07 DIAGNOSIS — E11.9 TYPE 2 DIABETES MELLITUS WITHOUT COMPLICATION, WITHOUT LONG-TERM CURRENT USE OF INSULIN (HCC): ICD-10-CM

## 2022-02-09 PROBLEM — Z01.818 PRE-OP EXAMINATION: Status: RESOLVED | Noted: 2022-01-10 | Resolved: 2022-02-09

## 2022-02-15 DIAGNOSIS — I10 ESSENTIAL HYPERTENSION: ICD-10-CM

## 2022-02-15 RX ORDER — VERAPAMIL HYDROCHLORIDE 80 MG/1
80 TABLET ORAL DAILY
Qty: 90 TABLET | Refills: 3 | Status: CANCELLED | OUTPATIENT
Start: 2022-02-15

## 2022-02-16 DIAGNOSIS — I10 ESSENTIAL HYPERTENSION: ICD-10-CM

## 2022-02-17 RX ORDER — VERAPAMIL HYDROCHLORIDE 240 MG/1
TABLET, FILM COATED, EXTENDED RELEASE ORAL
Qty: 90 TABLET | Refills: 1 | OUTPATIENT
Start: 2022-02-17

## 2022-02-28 ENCOUNTER — HOSPITAL ENCOUNTER (OUTPATIENT)
Age: 72
Discharge: HOME | End: 2022-03-02

## 2022-02-28 LAB
ABO/RH: NORMAL
ANTIBODY SCREEN: NORMAL

## 2022-02-28 PROCEDURE — 86901 BLOOD TYPING SEROLOGIC RH(D): CPT

## 2022-02-28 PROCEDURE — 86850 RBC ANTIBODY SCREEN: CPT

## 2022-02-28 PROCEDURE — 86900 BLOOD TYPING SEROLOGIC ABO: CPT

## 2022-03-01 ENCOUNTER — HOSPITAL ENCOUNTER (OUTPATIENT)
Age: 72
Discharge: HOME | End: 2022-03-03

## 2022-03-01 LAB
ANION GAP SERPL CALCULATED.3IONS-SCNC: 12 MMOL/L (ref 7–16)
BUN BLDV-MCNC: 19 MG/DL (ref 6–23)
CALCIUM SERPL-MCNC: 8.5 MG/DL (ref 8.6–10.2)
CHLORIDE BLD-SCNC: 100 MMOL/L (ref 98–107)
CO2: 23 MMOL/L (ref 22–29)
CREAT SERPL-MCNC: 0.7 MG/DL (ref 0.5–1)
GFR AFRICAN AMERICAN: >60
GFR NON-AFRICAN AMERICAN: >60 ML/MIN/1.73
GLUCOSE BLD-MCNC: 89 MG/DL (ref 74–99)
HCT VFR BLD CALC: 41.7 % (ref 34–48)
HEMOGLOBIN: 13.2 G/DL (ref 11.5–15.5)
POTASSIUM SERPL-SCNC: 4.3 MMOL/L (ref 3.5–5)
SODIUM BLD-SCNC: 135 MMOL/L (ref 132–146)

## 2022-03-01 PROCEDURE — 80048 BASIC METABOLIC PNL TOTAL CA: CPT

## 2022-03-01 PROCEDURE — 85014 HEMATOCRIT: CPT

## 2022-03-01 PROCEDURE — 85018 HEMOGLOBIN: CPT

## 2022-04-16 DIAGNOSIS — I10 ESSENTIAL HYPERTENSION: ICD-10-CM

## 2022-04-18 NOTE — TELEPHONE ENCOUNTER
Pt called stated she has not been keeping track of her BP, pt instructed to keep tract of her BP twice daily x1 week and she is to contact office bk with BP numbers, pt voiced understanding

## 2022-04-20 RX ORDER — CARVEDILOL 6.25 MG/1
6.25 TABLET ORAL 2 TIMES DAILY
Qty: 180 TABLET | Refills: 1 | Status: SHIPPED
Start: 2022-04-20 | End: 2022-08-31

## 2022-04-20 RX ORDER — CARVEDILOL 12.5 MG/1
TABLET ORAL
Qty: 180 TABLET | Refills: 3 | OUTPATIENT
Start: 2022-04-20

## 2022-07-27 NOTE — PATIENT INSTRUCTIONS
Get compression socks 20-30mmhg  Wear daily, can order them from [facility]. Hypoglycemia treatment:   If you have symptoms check sugar    What to do when sugar is less than 70 mg/dL  ? Take 15 grams of carbs (4oz juice or regular soda, frosting)  ? Recheck blood sugar in 15 minutes, if still less than 70 mg/dL, repeat 15 grams carbs  ? If greater than 1 hour before next meal eat 15 grams more carbs with 7 grams of protein (peanut butter crackers, 8oz milk, ½ sandwich)  ? Think about why your sugar dropped and try to avoid in the future      Hyperglycemia treatment:  What to do when sugars are high  ? Take correction insulin and drink plenty of fluids  ? Think about why blood sugars increased and try to avoid in the future    Sick Day: stress hormones from illness may increase sugars  ? If unable to eat drink lots of fluids  ? Check sugars every 2-3 hours  ? Do not take SGLT2 medications (Farxiga, [place], [place])  ? Continue to take long acting insulin (Levemir, Lantus, Trujeo), if sugar less than 80 mg/dL take 50% of dose  ?  Follow above for hypoglycemia, call office if sugars are high before giving short acting correction dose if you cannot eat, avoid taking short acting insulin without food Yes

## 2022-08-30 DIAGNOSIS — I10 ESSENTIAL HYPERTENSION: ICD-10-CM

## 2022-08-31 RX ORDER — CARVEDILOL 6.25 MG/1
TABLET ORAL
Qty: 180 TABLET | Refills: 3 | Status: SHIPPED | OUTPATIENT
Start: 2022-08-31

## 2022-11-18 ENCOUNTER — HOSPITAL ENCOUNTER (OUTPATIENT)
Age: 72
Discharge: HOME | End: 2022-11-20

## 2022-11-18 LAB
ABO/RH: NORMAL
ANTIBODY SCREEN: NORMAL

## 2022-11-18 PROCEDURE — 86901 BLOOD TYPING SEROLOGIC RH(D): CPT

## 2022-11-18 PROCEDURE — 87081 CULTURE SCREEN ONLY: CPT

## 2022-11-18 PROCEDURE — 86850 RBC ANTIBODY SCREEN: CPT

## 2022-11-18 PROCEDURE — 86900 BLOOD TYPING SEROLOGIC ABO: CPT

## 2022-11-19 ENCOUNTER — HOSPITAL ENCOUNTER (OUTPATIENT)
Age: 72
Discharge: HOME | End: 2022-11-21

## 2022-11-20 LAB — MRSA CULTURE ONLY: NORMAL

## 2022-12-01 ENCOUNTER — HOSPITAL ENCOUNTER (OUTPATIENT)
Age: 72
Discharge: HOME | End: 2022-12-03

## 2022-12-01 LAB
ANION GAP SERPL CALCULATED.3IONS-SCNC: 10 MMOL/L (ref 7–16)
BUN BLDV-MCNC: 17 MG/DL (ref 6–23)
CALCIUM SERPL-MCNC: 8.8 MG/DL (ref 8.6–10.2)
CHLORIDE BLD-SCNC: 100 MMOL/L (ref 98–107)
CO2: 25 MMOL/L (ref 22–29)
CREAT SERPL-MCNC: 0.7 MG/DL (ref 0.5–1)
GFR SERPL CREATININE-BSD FRML MDRD: >60 ML/MIN/1.73
GLUCOSE BLD-MCNC: 143 MG/DL (ref 74–99)
HCT VFR BLD CALC: 39.2 % (ref 34–48)
HEMOGLOBIN: 12.9 G/DL (ref 11.5–15.5)
POTASSIUM SERPL-SCNC: 4.1 MMOL/L (ref 3.5–5)
SODIUM BLD-SCNC: 135 MMOL/L (ref 132–146)

## 2022-12-01 PROCEDURE — 80048 BASIC METABOLIC PNL TOTAL CA: CPT

## 2022-12-01 PROCEDURE — 85014 HEMATOCRIT: CPT

## 2022-12-01 PROCEDURE — 85018 HEMOGLOBIN: CPT

## 2022-12-04 DIAGNOSIS — E11.9 TYPE 2 DIABETES MELLITUS WITHOUT COMPLICATION, WITHOUT LONG-TERM CURRENT USE OF INSULIN (HCC): ICD-10-CM

## 2022-12-11 DIAGNOSIS — I10 ESSENTIAL HYPERTENSION: ICD-10-CM

## 2022-12-12 RX ORDER — VERAPAMIL HYDROCHLORIDE 80 MG/1
80 TABLET ORAL DAILY
Qty: 90 TABLET | Refills: 3 | Status: SHIPPED | OUTPATIENT
Start: 2022-12-12

## 2022-12-16 DIAGNOSIS — I10 ESSENTIAL HYPERTENSION: ICD-10-CM

## 2022-12-19 DIAGNOSIS — Z12.31 ENCOUNTER FOR SCREENING MAMMOGRAM FOR MALIGNANT NEOPLASM OF BREAST: ICD-10-CM

## 2022-12-20 RX ORDER — VALSARTAN AND HYDROCHLOROTHIAZIDE 160; 25 MG/1; MG/1
TABLET ORAL
Qty: 90 TABLET | Refills: 3 | Status: SHIPPED | OUTPATIENT
Start: 2022-12-20

## 2023-07-24 DIAGNOSIS — Z11.59 ENCOUNTER FOR HEPATITIS C SCREENING TEST FOR LOW RISK PATIENT: ICD-10-CM

## 2023-07-24 DIAGNOSIS — E55.9 VITAMIN D DEFICIENCY: ICD-10-CM

## 2023-07-24 DIAGNOSIS — Z11.4 ENCOUNTER FOR SCREENING FOR HIV: ICD-10-CM

## 2023-07-24 DIAGNOSIS — I10 ESSENTIAL HYPERTENSION: ICD-10-CM

## 2023-07-24 DIAGNOSIS — E11.9 TYPE 2 DIABETES MELLITUS WITHOUT COMPLICATION, WITHOUT LONG-TERM CURRENT USE OF INSULIN (HCC): Primary | ICD-10-CM

## 2023-07-24 DIAGNOSIS — E78.00 PURE HYPERCHOLESTEROLEMIA: ICD-10-CM

## 2023-07-25 RX ORDER — CARVEDILOL 6.25 MG/1
TABLET ORAL
Qty: 60 TABLET | Refills: 0 | Status: SHIPPED | OUTPATIENT
Start: 2023-07-25

## 2024-09-25 ENCOUNTER — APPOINTMENT (OUTPATIENT)
Dept: GENERAL RADIOLOGY | Age: 74
End: 2024-09-25
Payer: COMMERCIAL

## 2024-09-25 ENCOUNTER — HOSPITAL ENCOUNTER (EMERGENCY)
Age: 74
Discharge: HOME | End: 2024-09-25
Attending: EMERGENCY MEDICINE
Payer: COMMERCIAL

## 2024-09-25 ENCOUNTER — APPOINTMENT (OUTPATIENT)
Dept: CT IMAGING | Age: 74
End: 2024-09-25
Payer: COMMERCIAL

## 2024-09-25 VITALS
HEART RATE: 62 BPM | SYSTOLIC BLOOD PRESSURE: 177 MMHG | WEIGHT: 242 LBS | DIASTOLIC BLOOD PRESSURE: 83 MMHG | BODY MASS INDEX: 38.89 KG/M2 | TEMPERATURE: 98.1 F | HEIGHT: 66 IN | OXYGEN SATURATION: 97 % | RESPIRATION RATE: 16 BRPM

## 2024-09-25 DIAGNOSIS — S82.034A CLOSED NONDISPLACED TRANSVERSE FRACTURE OF RIGHT PATELLA, INITIAL ENCOUNTER: ICD-10-CM

## 2024-09-25 DIAGNOSIS — E27.9 LESION OF ADRENAL GLAND (HCC): ICD-10-CM
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
## 2024-09-25 DIAGNOSIS — N28.89 RENAL MASS: ICD-10-CM

## 2024-09-25 DIAGNOSIS — S52.045A CLOSED NONDISPLACED FRACTURE OF CORONOID PROCESS OF LEFT ULNA, INITIAL ENCOUNTER: ICD-10-CM

## 2024-09-25 LAB
ALBUMIN SERPL-MCNC: 3.8 G/DL (ref 3.5–5.2)
ALP SERPL-CCNC: 70 U/L (ref 35–104)
ALT SERPL-CCNC: 22 U/L (ref 0–32)
ANION GAP SERPL CALCULATED.3IONS-SCNC: 11 MMOL/L (ref 7–16)
AST SERPL-CCNC: 26 U/L (ref 0–31)
BASOPHILS # BLD: 0.06 K/UL (ref 0–0.2)
BASOPHILS NFR BLD: 1 % (ref 0–2)
BILIRUB SERPL-MCNC: 0.4 MG/DL (ref 0–1.2)
BUN SERPL-MCNC: 25 MG/DL (ref 6–23)
CALCIUM SERPL-MCNC: 8.9 MG/DL (ref 8.6–10.2)
CHLORIDE SERPL-SCNC: 99 MMOL/L (ref 98–107)
CO2 SERPL-SCNC: 26 MMOL/L (ref 22–29)
CREAT SERPL-MCNC: 0.7 MG/DL (ref 0.5–1)
EOSINOPHIL # BLD: 0.13 K/UL (ref 0.05–0.5)
EOSINOPHILS RELATIVE PERCENT: 2 % (ref 0–6)
ERYTHROCYTE [DISTWIDTH] IN BLOOD BY AUTOMATED COUNT: 13.5 % (ref 11.5–15)
GFR, ESTIMATED: >90 ML/MIN/1.73M2
GLUCOSE SERPL-MCNC: 154 MG/DL (ref 74–99)
HCT VFR BLD AUTO: 44.3 % (ref 34–48)
HGB BLD-MCNC: 14.5 G/DL (ref 11.5–15.5)
IMM GRANULOCYTES # BLD AUTO: <0.03 K/UL (ref 0–0.58)
IMM GRANULOCYTES NFR BLD: 0 % (ref 0–5)
INR PPP: 1.1
LYMPHOCYTES NFR BLD: 1.8 K/UL (ref 1.5–4)
LYMPHOCYTES RELATIVE PERCENT: 24 % (ref 20–42)
MCH RBC QN AUTO: 30.4 PG (ref 26–35)
MCHC RBC AUTO-ENTMCNC: 32.7 G/DL (ref 32–34.5)
MCV RBC AUTO: 92.9 FL (ref 80–99.9)
MONOCYTES NFR BLD: 0.6 K/UL (ref 0.1–0.95)
MONOCYTES NFR BLD: 8 % (ref 2–12)
NEUTROPHILS NFR BLD: 66 % (ref 43–80)
NEUTS SEG NFR BLD: 5.01 K/UL (ref 1.8–7.3)
PLATELET # BLD AUTO: 237 K/UL (ref 130–450)
PMV BLD AUTO: 9.9 FL (ref 7–12)
POTASSIUM SERPL-SCNC: 4.1 MMOL/L (ref 3.5–5)
PROT SERPL-MCNC: 6.8 G/DL (ref 6.4–8.3)
PROTHROMBIN TIME: 11.6 SEC (ref 9.3–12.4)
RBC # BLD AUTO: 4.77 M/UL (ref 3.5–5.5)
SODIUM SERPL-SCNC: 136 MMOL/L (ref 132–146)
WBC OTHER # BLD: 7.6 K/UL (ref 4.5–11.5)

## 2024-09-25 PROCEDURE — 73080 X-RAY EXAM OF ELBOW: CPT

## 2024-09-25 PROCEDURE — 6360000004 HC RX CONTRAST MEDICATION: Performed by: RADIOLOGY

## 2024-09-25 PROCEDURE — 72125 CT NECK SPINE W/O DYE: CPT

## 2024-09-25 PROCEDURE — 6370000000 HC RX 637 (ALT 250 FOR IP): Performed by: EMERGENCY MEDICINE

## 2024-09-25 PROCEDURE — 6360000002 HC RX W HCPCS

## 2024-09-25 PROCEDURE — 73610 X-RAY EXAM OF ANKLE: CPT

## 2024-09-25 PROCEDURE — 71260 CT THORAX DX C+: CPT

## 2024-09-25 PROCEDURE — 96374 THER/PROPH/DIAG INJ IV PUSH: CPT

## 2024-09-25 PROCEDURE — 99285 EMERGENCY DEPT VISIT HI MDM: CPT

## 2024-09-25 PROCEDURE — 80053 COMPREHEN METABOLIC PANEL: CPT

## 2024-09-25 PROCEDURE — 74177 CT ABD & PELVIS W/CONTRAST: CPT

## 2024-09-25 PROCEDURE — 85025 COMPLETE CBC W/AUTO DIFF WBC: CPT

## 2024-09-25 PROCEDURE — 85610 PROTHROMBIN TIME: CPT

## 2024-09-25 PROCEDURE — 73562 X-RAY EXAM OF KNEE 3: CPT

## 2024-09-25 PROCEDURE — 70450 CT HEAD/BRAIN W/O DYE: CPT

## 2024-09-25 RX ORDER — TRAMADOL HYDROCHLORIDE 50 MG/1
50 TABLET ORAL ONCE
Status: COMPLETED | OUTPATIENT
Start: 2024-09-25 | End: 2024-09-25

## 2024-09-25 RX ORDER — IOPAMIDOL 755 MG/ML
75 INJECTION, SOLUTION INTRAVASCULAR
Status: COMPLETED | OUTPATIENT
Start: 2024-09-25 | End: 2024-09-25

## 2024-09-25 RX ORDER — TRAMADOL HYDROCHLORIDE 50 MG/1
50 TABLET ORAL EVERY 6 HOURS PRN
Qty: 12 TABLET | Refills: 0 | Status: SHIPPED | OUTPATIENT
Start: 2024-09-25 | End: 2024-09-28

## 2024-09-25 RX ORDER — FENTANYL CITRATE 50 UG/ML
50 INJECTION, SOLUTION INTRAMUSCULAR; INTRAVENOUS ONCE
Status: COMPLETED | OUTPATIENT
Start: 2024-09-25 | End: 2024-09-25

## 2024-09-25 RX ADMIN — FENTANYL CITRATE 50 MCG: 50 INJECTION INTRAMUSCULAR; INTRAVENOUS at 13:02

## 2024-09-25 RX ADMIN — IOPAMIDOL 75 ML: 755 INJECTION, SOLUTION INTRAVENOUS at 13:46

## 2024-09-25 RX ADMIN — TRAMADOL HYDROCHLORIDE 50 MG: 50 TABLET, COATED ORAL at 16:04

## 2024-09-25 ASSESSMENT — LIFESTYLE VARIABLES
HOW OFTEN DO YOU HAVE A DRINK CONTAINING ALCOHOL: NEVER
HOW MANY STANDARD DRINKS CONTAINING ALCOHOL DO YOU HAVE ON A TYPICAL DAY: PATIENT DOES NOT DRINK

## 2024-09-25 ASSESSMENT — PAIN - FUNCTIONAL ASSESSMENT: PAIN_FUNCTIONAL_ASSESSMENT: 0-10

## 2024-09-25 ASSESSMENT — PAIN SCALES - GENERAL
PAINLEVEL_OUTOF10: 8
PAINLEVEL_OUTOF10: 8
PAINLEVEL_OUTOF10: 9

## 2024-10-07 ENCOUNTER — PREP FOR PROCEDURE (OUTPATIENT)
Dept: UROLOGY | Age: 74
End: 2024-10-07

## 2024-10-07 DIAGNOSIS — N28.89 RENAL MASS: Primary | ICD-10-CM

## 2024-10-07 RX ORDER — SODIUM CHLORIDE 0.9 % (FLUSH) 0.9 %
5-40 SYRINGE (ML) INJECTION EVERY 12 HOURS SCHEDULED
Status: CANCELLED | OUTPATIENT
Start: 2024-10-07

## 2024-10-07 RX ORDER — SODIUM CHLORIDE 0.9 % (FLUSH) 0.9 %
5-40 SYRINGE (ML) INJECTION PRN
Status: CANCELLED | OUTPATIENT
Start: 2024-10-07

## 2024-10-07 RX ORDER — SODIUM CHLORIDE 9 MG/ML
INJECTION, SOLUTION INTRAVENOUS PRN
Status: CANCELLED | OUTPATIENT
Start: 2024-10-07

## 2024-10-08 ENCOUNTER — HOSPITAL ENCOUNTER (OUTPATIENT)
Dept: PREADMISSION TESTING | Age: 74
Discharge: HOME | End: 2024-10-08
Payer: MEDICARE

## 2024-10-08 VITALS
RESPIRATION RATE: 16 BRPM | TEMPERATURE: 97.1 F | SYSTOLIC BLOOD PRESSURE: 138 MMHG | WEIGHT: 251.2 LBS | BODY MASS INDEX: 39.43 KG/M2 | DIASTOLIC BLOOD PRESSURE: 78 MMHG | OXYGEN SATURATION: 97 % | HEART RATE: 68 BPM | HEIGHT: 67 IN

## 2024-10-08 DIAGNOSIS — N28.89 RENAL MASS: ICD-10-CM

## 2024-10-08 LAB
ABO + RH BLD: NORMAL
ANION GAP SERPL CALCULATED.3IONS-SCNC: 11 MMOL/L (ref 7–16)
ARM BAND NUMBER: NORMAL
BLOOD BANK SAMPLE EXPIRATION: NORMAL
BLOOD GROUP ANTIBODIES SERPL: NEGATIVE
BUN SERPL-MCNC: 21 MG/DL (ref 6–23)
CALCIUM SERPL-MCNC: 9.3 MG/DL (ref 8.6–10.2)
CHLORIDE SERPL-SCNC: 103 MMOL/L (ref 98–107)
CO2 SERPL-SCNC: 24 MMOL/L (ref 22–29)
CREAT SERPL-MCNC: 0.8 MG/DL (ref 0.5–1)
EKG ATRIAL RATE: 58 BPM
EKG P AXIS: 43 DEGREES
EKG P-R INTERVAL: 184 MS
EKG Q-T INTERVAL: 446 MS
EKG QRS DURATION: 106 MS
EKG QTC CALCULATION (BAZETT): 437 MS
EKG R AXIS: -25 DEGREES
EKG T AXIS: 42 DEGREES
EKG VENTRICULAR RATE: 58 BPM
ERYTHROCYTE [DISTWIDTH] IN BLOOD BY AUTOMATED COUNT: 13.1 % (ref 11.5–15)
GFR, ESTIMATED: 81 ML/MIN/1.73M2
GLUCOSE SERPL-MCNC: 118 MG/DL (ref 74–99)
HCT VFR BLD AUTO: 44.4 % (ref 34–48)
HGB BLD-MCNC: 14.4 G/DL (ref 11.5–15.5)
MCH RBC QN AUTO: 30.3 PG (ref 26–35)
MCHC RBC AUTO-ENTMCNC: 32.4 G/DL (ref 32–34.5)
MCV RBC AUTO: 93.3 FL (ref 80–99.9)
PLATELET # BLD AUTO: 247 K/UL (ref 130–450)
PMV BLD AUTO: 9.6 FL (ref 7–12)
POTASSIUM SERPL-SCNC: 4.1 MMOL/L (ref 3.5–5)
RBC # BLD AUTO: 4.76 M/UL (ref 3.5–5.5)
SODIUM SERPL-SCNC: 138 MMOL/L (ref 132–146)
WBC OTHER # BLD: 5.8 K/UL (ref 4.5–11.5)

## 2024-10-08 PROCEDURE — 85027 COMPLETE CBC AUTOMATED: CPT

## 2024-10-08 PROCEDURE — 36415 COLL VENOUS BLD VENIPUNCTURE: CPT

## 2024-10-08 PROCEDURE — 86850 RBC ANTIBODY SCREEN: CPT

## 2024-10-08 PROCEDURE — 80048 BASIC METABOLIC PNL TOTAL CA: CPT

## 2024-10-08 PROCEDURE — 86900 BLOOD TYPING SEROLOGIC ABO: CPT

## 2024-10-08 PROCEDURE — 86901 BLOOD TYPING SEROLOGIC RH(D): CPT

## 2024-10-08 ASSESSMENT — PAIN DESCRIPTION - DESCRIPTORS: DESCRIPTORS: ACHING

## 2024-10-08 ASSESSMENT — PAIN DESCRIPTION - PAIN TYPE: TYPE: CHRONIC PAIN

## 2024-10-08 ASSESSMENT — PAIN SCALES - GENERAL: PAINLEVEL_OUTOF10: 2

## 2024-10-08 NOTE — DISCHARGE INSTRUCTIONS
ce: 10/8/2024 10:30 AM     Signed         [facility] PRE-ADMISSION TESTING INSTRUCTIONS     The Preadmission Testing patient is instructed accordingly using the following criteria (check applicable):     ARRIVAL INSTRUCTIONS:  [x] Parking the day of Surgery is located in the Main Entrance lot.  Upon entering the door, make an immediate right to the surgery reception desk     [x] Bring photo ID and insurance card     [] Bring in a copy of Living will or Durable Power of  papers.     [x] Please be sure to arrange for responsible adult to provide transportation to and from the hospital     [x] Please arrange for responsible adult to be with you for the 24 hour period post procedure due to having anesthesia     [x] If you awake am of surgery not feeling well or have temperature >100 please call [phone]     GENERAL INSTRUCTIONS:     [x] Nothing by mouth after midnight, including gum, candy, mints or water     [x] You may brush your teeth, but do not swallow any water     [x] Take medications as instructed with 1-2 oz of water     [x] Stop herbal supplements and vitamins 5 days prior to procedure     [] Follow preop dosing of blood thinners per physician instructions     [] Take 1/2 dose of evening insulin, but no insulin after midnight     [x] No oral diabetic medications after midnight     [x] If diabetic and have low blood sugar or feel symptomatic, take 1-2oz apple juice only     [] Bring inhalers day of surgery     [] Bring C-PAP/ Bi-Pap day of surgery     [] Bring urine specimen day of surgery     [x] Shower or bath with soap, lather and rinse well, AM of Surgery, no lotion, powders or creams to surgical site     [] Follow bowel prep as instructed per surgeon     [x] No tobacco products within 24 hours of surgery      [x] No alcohol or illegal drug use within 24 hours of surgery.     [x] Jewelry, body piercing's, eyeglasses, contact lenses and dentures are not permitted into

## 2024-10-08 NOTE — PROGRESS NOTES
[facility] PRE-ADMISSION TESTING INSTRUCTIONS    The Preadmission Testing patient is instructed accordingly using the following criteria (check applicable):    ARRIVAL INSTRUCTIONS:  [x] Parking the day of Surgery is located in the Main Entrance lot.  Upon entering the door, make an immediate right to the surgery reception desk    [x] Bring photo ID and insurance card    [] Bring in a copy of Living will or Durable Power of  papers.    [x] Please be sure to arrange for responsible adult to provide transportation to and from the hospital    [x] Please arrange for responsible adult to be with you for the 24 hour period post procedure due to having anesthesia    [x] If you awake am of surgery not feeling well or have temperature >100 please call [phone]    GENERAL INSTRUCTIONS:    [x] Nothing by mouth after midnight, including gum, candy, mints or water    [x] You may brush your teeth, but do not swallow any water    [x] Take medications as instructed with 1-2 oz of water    [x] Stop herbal supplements and vitamins 5 days prior to procedure    [] Follow preop dosing of blood thinners per physician instructions    [] Take 1/2 dose of evening insulin, but no insulin after midnight    [x] No oral diabetic medications after midnight    [x] If diabetic and have low blood sugar or feel symptomatic, take 1-2oz apple juice only    [] Bring inhalers day of surgery    [] Bring C-PAP/ Bi-Pap day of surgery    [] Bring urine specimen day of surgery    [x] Shower or bath with soap, lather and rinse well, AM of Surgery, no lotion, powders or creams to surgical site    [] Follow bowel prep as instructed per surgeon    [x] No tobacco products within 24 hours of surgery     [x] No alcohol or illegal drug use within 24 hours of surgery.    [x] Jewelry, body piercing's, eyeglasses, contact lenses and dentures are not permitted into surgery (bring cases)      [] Please do not wear any nail polish,

## 2024-10-14 ENCOUNTER — ANESTHESIA EVENT (OUTPATIENT)
Dept: OPERATING ROOM | Age: 74
DRG: 660 | End: 2024-10-14
Payer: MEDICARE

## 2024-10-15 ENCOUNTER — ANESTHESIA (OUTPATIENT)
Dept: OPERATING ROOM | Age: 74
DRG: 660 | End: 2024-10-15
Payer: MEDICARE

## 2024-10-15 ENCOUNTER — HOSPITAL ENCOUNTER (INPATIENT)
Age: 74
LOS: 1 days | Discharge: HOME | DRG: 660 | End: 2024-10-16
Attending: UROLOGY | Admitting: UROLOGY
Payer: MEDICARE

## 2024-10-15 DIAGNOSIS — N28.9 GENERAL CONDITIONS OF THE KIDNEY AND URETER: ICD-10-CM

## 2024-10-15 DIAGNOSIS — Z84.2: ICD-10-CM

## 2024-10-15 DIAGNOSIS — G89.18 POST-OPERATIVE PAIN: Primary | ICD-10-CM

## 2024-10-15 PROBLEM — N28.89 RENAL MASS: Status: ACTIVE | Noted: 2024-10-15

## 2024-10-15 LAB
ABO/RH: NORMAL
ANTIBODY SCREEN: NEGATIVE
ARM BAND NUMBER: NORMAL
BLOOD BANK DISPENSE STATUS: NORMAL
BLOOD BANK DISPENSE STATUS: NORMAL
BLOOD BANK SAMPLE EXPIRATION: NORMAL
BPU ID: NORMAL
BPU ID: NORMAL
COMPONENT: NORMAL
COMPONENT: NORMAL
CROSSMATCH RESULT: NORMAL
CROSSMATCH RESULT: NORMAL
GLUCOSE BLD-MCNC: 154 MG/DL (ref 74–99)
GLUCOSE BLD-MCNC: 248 MG/DL (ref 74–99)
TRANSFUSION STATUS: NORMAL
TRANSFUSION STATUS: NORMAL
UNIT DIVISION: 0
UNIT DIVISION: 0

## 2024-10-15 PROCEDURE — 2500000003 HC RX 250 WO HCPCS: Performed by: UROLOGY

## 2024-10-15 PROCEDURE — 7100000000 HC PACU RECOVERY - FIRST 15 MIN: Performed by: UROLOGY

## 2024-10-15 PROCEDURE — 3700000001 HC ADD 15 MINUTES (ANESTHESIA): Performed by: UROLOGY

## 2024-10-15 PROCEDURE — 6370000000 HC RX 637 (ALT 250 FOR IP): Performed by: UROLOGY

## 2024-10-15 PROCEDURE — 6370000000 HC RX 637 (ALT 250 FOR IP): Performed by: STUDENT IN AN ORGANIZED HEALTH CARE EDUCATION/TRAINING PROGRAM

## 2024-10-15 PROCEDURE — 7100000001 HC PACU RECOVERY - ADDTL 15 MIN: Performed by: UROLOGY

## 2024-10-15 PROCEDURE — 3600000009 HC SURGERY ROBOT BASE: Performed by: UROLOGY

## 2024-10-15 PROCEDURE — 2580000003 HC RX 258

## 2024-10-15 PROCEDURE — 82962 GLUCOSE BLOOD TEST: CPT

## 2024-10-15 PROCEDURE — 2580000003 HC RX 258: Performed by: UROLOGY

## 2024-10-15 PROCEDURE — 2580000003 HC RX 258: Performed by: NURSE PRACTITIONER

## 2024-10-15 PROCEDURE — 88307 TISSUE EXAM BY PATHOLOGIST: CPT

## 2024-10-15 PROCEDURE — 88342 IMHCHEM/IMCYTCHM 1ST ANTB: CPT

## 2024-10-15 PROCEDURE — 36620 INSERTION CATHETER ARTERY: CPT | Performed by: ANESTHESIOLOGY

## 2024-10-15 PROCEDURE — C1729 CATH, DRAINAGE: HCPCS | Performed by: UROLOGY

## 2024-10-15 PROCEDURE — 8E0W4CZ ROBOTIC ASSISTED PROCEDURE OF TRUNK REGION, PERCUTANEOUS ENDOSCOPIC APPROACH: ICD-10-PCS | Performed by: UROLOGY

## 2024-10-15 PROCEDURE — 99222 1ST HOSP IP/OBS MODERATE 55: CPT | Performed by: STUDENT IN AN ORGANIZED HEALTH CARE EDUCATION/TRAINING PROGRAM

## 2024-10-15 PROCEDURE — 3600000019 HC SURGERY ROBOT ADDTL 15MIN: Performed by: UROLOGY

## 2024-10-15 PROCEDURE — 6360000002 HC RX W HCPCS: Performed by: NURSE PRACTITIONER

## 2024-10-15 PROCEDURE — 6360000002 HC RX W HCPCS: Performed by: ANESTHESIOLOGY

## 2024-10-15 PROCEDURE — C1889 IMPLANT/INSERT DEVICE, NOC: HCPCS | Performed by: UROLOGY

## 2024-10-15 PROCEDURE — 6360000002 HC RX W HCPCS: Performed by: UROLOGY

## 2024-10-15 PROCEDURE — 6360000002 HC RX W HCPCS

## 2024-10-15 PROCEDURE — 64488 TAP BLOCK BI INJECTION: CPT | Performed by: ANESTHESIOLOGY

## 2024-10-15 PROCEDURE — 2060000000 HC ICU INTERMEDIATE R&B

## 2024-10-15 PROCEDURE — 2709999900 HC NON-CHARGEABLE SUPPLY: Performed by: UROLOGY

## 2024-10-15 PROCEDURE — 1200000000 HC SEMI PRIVATE

## 2024-10-15 PROCEDURE — 2500000003 HC RX 250 WO HCPCS

## 2024-10-15 PROCEDURE — 3700000000 HC ANESTHESIA ATTENDED CARE: Performed by: UROLOGY

## 2024-10-15 PROCEDURE — 88341 IMHCHEM/IMCYTCHM EA ADD ANTB: CPT

## 2024-10-15 PROCEDURE — 0TB04ZZ EXCISION OF RIGHT KIDNEY, PERCUTANEOUS ENDOSCOPIC APPROACH: ICD-10-PCS | Performed by: UROLOGY

## 2024-10-15 PROCEDURE — S2900 ROBOTIC SURGICAL SYSTEM: HCPCS | Performed by: UROLOGY

## 2024-10-15 DEVICE — CLIP INT L POLYMER LOK LIG HEM O LOK (6EA/PK): Type: IMPLANTABLE DEVICE | Status: FUNCTIONAL

## 2024-10-15 DEVICE — CLIP INT M L POLYMER LOK LIG HEM O LOK: Type: IMPLANTABLE DEVICE | Status: FUNCTIONAL

## 2024-10-15 RX ORDER — INSULIN LISPRO 100 [IU]/ML
0-4 INJECTION, SOLUTION INTRAVENOUS; SUBCUTANEOUS
Status: DISCONTINUED | OUTPATIENT
Start: 2024-10-15 | End: 2024-10-16

## 2024-10-15 RX ORDER — DIPHENHYDRAMINE HYDROCHLORIDE 50 MG/ML
INJECTION INTRAMUSCULAR; INTRAVENOUS
Status: DISCONTINUED | OUTPATIENT
Start: 2024-10-15 | End: 2024-10-15 | Stop reason: SDUPTHER

## 2024-10-15 RX ORDER — LIDOCAINE HYDROCHLORIDE 10 MG/ML
INJECTION, SOLUTION EPIDURAL; INFILTRATION; INTRACAUDAL; PERINEURAL
Status: DISCONTINUED | OUTPATIENT
Start: 2024-10-15 | End: 2024-10-15 | Stop reason: SDUPTHER

## 2024-10-15 RX ORDER — SODIUM CHLORIDE 0.9 % (FLUSH) 0.9 %
5-40 SYRINGE (ML) INJECTION PRN
Status: DISCONTINUED | OUTPATIENT
Start: 2024-10-15 | End: 2024-10-15

## 2024-10-15 RX ORDER — ROPIVACAINE HYDROCHLORIDE 5 MG/ML
INJECTION, SOLUTION EPIDURAL; INFILTRATION; PERINEURAL
Status: COMPLETED | OUTPATIENT
Start: 2024-10-15 | End: 2024-10-15

## 2024-10-15 RX ORDER — LIDOCAINE HYDROCHLORIDE 10 MG/ML
INJECTION, SOLUTION EPIDURAL; INFILTRATION; INTRACAUDAL; PERINEURAL
Status: DISCONTINUED | OUTPATIENT
Start: 2024-10-15 | End: 2024-10-15

## 2024-10-15 RX ORDER — OXYCODONE HYDROCHLORIDE 5 MG/1
5 TABLET ORAL EVERY 6 HOURS PRN
Qty: 12 TABLET | Refills: 0 | Status: SHIPPED | OUTPATIENT
Start: 2024-10-15 | End: 2024-10-18

## 2024-10-15 RX ORDER — SODIUM CHLORIDE, SODIUM LACTATE, POTASSIUM CHLORIDE, CALCIUM CHLORIDE 600; 310; 30; 20 MG/100ML; MG/100ML; MG/100ML; MG/100ML
INJECTION, SOLUTION INTRAVENOUS
Status: DISCONTINUED | OUTPATIENT
Start: 2024-10-15 | End: 2024-10-15

## 2024-10-15 RX ORDER — VERAPAMIL HYDROCHLORIDE 80 MG/1
80 TABLET ORAL DAILY
Status: DISCONTINUED | OUTPATIENT
Start: 2024-10-16 | End: 2024-10-16 | Stop reason: HOSPADM

## 2024-10-15 RX ORDER — PROPOFOL 10 MG/ML
INJECTION, EMULSION INTRAVENOUS
Status: DISCONTINUED | OUTPATIENT
Start: 2024-10-15 | End: 2024-10-15 | Stop reason: SDUPTHER

## 2024-10-15 RX ORDER — DEXTROSE MONOHYDRATE 100 MG/ML
INJECTION, SOLUTION INTRAVENOUS CONTINUOUS PRN
Status: DISCONTINUED | OUTPATIENT
Start: 2024-10-15 | End: 2024-10-16 | Stop reason: HOSPADM

## 2024-10-15 RX ORDER — SODIUM CHLORIDE 9 MG/ML
INJECTION, SOLUTION INTRAVENOUS PRN
Status: DISCONTINUED | OUTPATIENT
Start: 2024-10-15 | End: 2024-10-15 | Stop reason: HOSPADM

## 2024-10-15 RX ORDER — HYDROCHLOROTHIAZIDE 25 MG/1
25 TABLET ORAL NIGHTLY
Status: DISCONTINUED | OUTPATIENT
Start: 2024-10-15 | End: 2024-10-16 | Stop reason: HOSPADM

## 2024-10-15 RX ORDER — ROPIVACAINE HYDROCHLORIDE 5 MG/ML
40 INJECTION, SOLUTION EPIDURAL; INFILTRATION; PERINEURAL
Status: DISCONTINUED | OUTPATIENT
Start: 2024-10-15 | End: 2024-10-15

## 2024-10-15 RX ORDER — ONDANSETRON 2 MG/ML
4 INJECTION INTRAMUSCULAR; INTRAVENOUS
Status: DISCONTINUED | OUTPATIENT
Start: 2024-10-15 | End: 2024-10-15 | Stop reason: HOSPADM

## 2024-10-15 RX ORDER — MEPERIDINE HYDROCHLORIDE 25 MG/ML
12.5 INJECTION INTRAMUSCULAR; INTRAVENOUS; SUBCUTANEOUS EVERY 5 MIN PRN
Status: DISCONTINUED | OUTPATIENT
Start: 2024-10-15 | End: 2024-10-15 | Stop reason: HOSPADM

## 2024-10-15 RX ORDER — LIDOCAINE HYDROCHLORIDE 20 MG/ML
INJECTION, SOLUTION EPIDURAL; INFILTRATION; INTRACAUDAL; PERINEURAL
Status: DISCONTINUED | OUTPATIENT
Start: 2024-10-15 | End: 2024-10-15 | Stop reason: SDUPTHER

## 2024-10-15 RX ORDER — MIDAZOLAM HYDROCHLORIDE 2 MG/2ML
2 INJECTION, SOLUTION INTRAMUSCULAR; INTRAVENOUS
Status: DISCONTINUED | OUTPATIENT
Start: 2024-10-15 | End: 2024-10-15 | Stop reason: HOSPADM

## 2024-10-15 RX ORDER — SODIUM CHLORIDE AND POTASSIUM CHLORIDE 150; 450 MG/100ML; MG/100ML
INJECTION, SOLUTION INTRAVENOUS CONTINUOUS
Status: DISCONTINUED | OUTPATIENT
Start: 2024-10-15 | End: 2024-10-16 | Stop reason: HOSPADM

## 2024-10-15 RX ORDER — ROCURONIUM BROMIDE 10 MG/ML
INJECTION, SOLUTION INTRAVENOUS
Status: DISCONTINUED | OUTPATIENT
Start: 2024-10-15 | End: 2024-10-15 | Stop reason: SDUPTHER

## 2024-10-15 RX ORDER — VALSARTAN AND HYDROCHLOROTHIAZIDE 160; 25 MG/1; MG/1
1 TABLET ORAL NIGHTLY
Status: DISCONTINUED | OUTPATIENT
Start: 2024-10-15 | End: 2024-10-15 | Stop reason: CLARIF

## 2024-10-15 RX ORDER — MANNITOL 250 MG/ML
INJECTION, SOLUTION INTRAVENOUS
Status: DISCONTINUED | OUTPATIENT
Start: 2024-10-15 | End: 2024-10-15 | Stop reason: SDUPTHER

## 2024-10-15 RX ORDER — HYDRALAZINE HYDROCHLORIDE 20 MG/ML
10 INJECTION INTRAMUSCULAR; INTRAVENOUS
Status: DISCONTINUED | OUTPATIENT
Start: 2024-10-15 | End: 2024-10-15 | Stop reason: HOSPADM

## 2024-10-15 RX ORDER — SODIUM CHLORIDE 0.9 % (FLUSH) 0.9 %
5-40 SYRINGE (ML) INJECTION EVERY 12 HOURS SCHEDULED
Status: DISCONTINUED | OUTPATIENT
Start: 2024-10-15 | End: 2024-10-15 | Stop reason: HOSPADM

## 2024-10-15 RX ORDER — LABETALOL HYDROCHLORIDE 5 MG/ML
10 INJECTION, SOLUTION INTRAVENOUS
Status: DISCONTINUED | OUTPATIENT
Start: 2024-10-15 | End: 2024-10-15 | Stop reason: HOSPADM

## 2024-10-15 RX ORDER — DEXAMETHASONE SODIUM PHOSPHATE 4 MG/ML
INJECTION, SOLUTION INTRA-ARTICULAR; INTRALESIONAL; INTRAMUSCULAR; INTRAVENOUS; SOFT TISSUE
Status: DISCONTINUED | OUTPATIENT
Start: 2024-10-15 | End: 2024-10-15 | Stop reason: SDUPTHER

## 2024-10-15 RX ORDER — MIDAZOLAM HYDROCHLORIDE 1 MG/ML
INJECTION INTRAMUSCULAR; INTRAVENOUS
Status: DISCONTINUED | OUTPATIENT
Start: 2024-10-15 | End: 2024-10-15 | Stop reason: SDUPTHER

## 2024-10-15 RX ORDER — SODIUM CHLORIDE 9 MG/ML
INJECTION, SOLUTION INTRAVENOUS PRN
Status: DISCONTINUED | OUTPATIENT
Start: 2024-10-15 | End: 2024-10-15

## 2024-10-15 RX ORDER — MIDAZOLAM HYDROCHLORIDE 2 MG/2ML
1 INJECTION, SOLUTION INTRAMUSCULAR; INTRAVENOUS EVERY 5 MIN PRN
Status: DISCONTINUED | OUTPATIENT
Start: 2024-10-15 | End: 2024-10-15

## 2024-10-15 RX ORDER — ONDANSETRON 2 MG/ML
INJECTION INTRAMUSCULAR; INTRAVENOUS
Status: DISCONTINUED | OUTPATIENT
Start: 2024-10-15 | End: 2024-10-15 | Stop reason: SDUPTHER

## 2024-10-15 RX ORDER — SODIUM CHLORIDE 0.9 % (FLUSH) 0.9 %
5-40 SYRINGE (ML) INJECTION PRN
Status: DISCONTINUED | OUTPATIENT
Start: 2024-10-15 | End: 2024-10-15 | Stop reason: HOSPADM

## 2024-10-15 RX ORDER — SODIUM CHLORIDE 0.9 % (FLUSH) 0.9 %
5-40 SYRINGE (ML) INJECTION EVERY 12 HOURS SCHEDULED
Status: DISCONTINUED | OUTPATIENT
Start: 2024-10-15 | End: 2024-10-15

## 2024-10-15 RX ORDER — CARVEDILOL 6.25 MG/1
6.25 TABLET ORAL 2 TIMES DAILY
Status: DISCONTINUED | OUTPATIENT
Start: 2024-10-15 | End: 2024-10-16 | Stop reason: HOSPADM

## 2024-10-15 RX ORDER — GLUCAGON 1 MG/ML
1 KIT INJECTION PRN
Status: DISCONTINUED | OUTPATIENT
Start: 2024-10-15 | End: 2024-10-16 | Stop reason: HOSPADM

## 2024-10-15 RX ORDER — VALSARTAN 160 MG/1
160 TABLET ORAL NIGHTLY
Status: DISCONTINUED | OUTPATIENT
Start: 2024-10-15 | End: 2024-10-16 | Stop reason: HOSPADM

## 2024-10-15 RX ORDER — BUPIVACAINE HYDROCHLORIDE 5 MG/ML
INJECTION, SOLUTION EPIDURAL; INTRACAUDAL PRN
Status: DISCONTINUED | OUTPATIENT
Start: 2024-10-15 | End: 2024-10-15 | Stop reason: ALTCHOICE

## 2024-10-15 RX ORDER — SENNOSIDES A AND B 8.6 MG/1
1 TABLET, FILM COATED ORAL 2 TIMES DAILY
Qty: 20 TABLET | Refills: 0 | Status: SHIPPED | OUTPATIENT
Start: 2024-10-15 | End: 2024-10-25

## 2024-10-15 RX ORDER — IPRATROPIUM BROMIDE AND ALBUTEROL SULFATE 2.5; .5 MG/3ML; MG/3ML
1 SOLUTION RESPIRATORY (INHALATION)
Status: DISCONTINUED | OUTPATIENT
Start: 2024-10-15 | End: 2024-10-15 | Stop reason: HOSPADM

## 2024-10-15 RX ORDER — MORPHINE SULFATE 2 MG/ML
2 INJECTION, SOLUTION INTRAMUSCULAR; INTRAVENOUS EVERY 4 HOURS PRN
Status: DISCONTINUED | OUTPATIENT
Start: 2024-10-15 | End: 2024-10-16 | Stop reason: HOSPADM

## 2024-10-15 RX ORDER — SODIUM CHLORIDE 9 MG/ML
INJECTION, SOLUTION INTRAVENOUS
Status: DISCONTINUED | OUTPATIENT
Start: 2024-10-15 | End: 2024-10-15 | Stop reason: SDUPTHER

## 2024-10-15 RX ORDER — ACETAMINOPHEN 325 MG/1
650 TABLET ORAL 4 TIMES DAILY
Status: DISCONTINUED | OUTPATIENT
Start: 2024-10-15 | End: 2024-10-16 | Stop reason: HOSPADM

## 2024-10-15 RX ORDER — FENTANYL CITRATE 50 UG/ML
100 INJECTION, SOLUTION INTRAMUSCULAR; INTRAVENOUS ONCE
Status: COMPLETED | OUTPATIENT
Start: 2024-10-15 | End: 2024-10-15

## 2024-10-15 RX ORDER — METOCLOPRAMIDE HYDROCHLORIDE 5 MG/ML
10 INJECTION INTRAMUSCULAR; INTRAVENOUS 2 TIMES DAILY
Status: DISCONTINUED | OUTPATIENT
Start: 2024-10-15 | End: 2024-10-16 | Stop reason: HOSPADM

## 2024-10-15 RX ORDER — SODIUM CHLORIDE, SODIUM LACTATE, POTASSIUM CHLORIDE, CALCIUM CHLORIDE 600; 310; 30; 20 MG/100ML; MG/100ML; MG/100ML; MG/100ML
INJECTION, SOLUTION INTRAVENOUS
Status: DISCONTINUED | OUTPATIENT
Start: 2024-10-15 | End: 2024-10-15 | Stop reason: SDUPTHER

## 2024-10-15 RX ORDER — FIBRINOGEN HUMAN AND THROMBIN HUMAN 1 ML
KIT TOPICAL
Status: COMPLETED | OUTPATIENT
Start: 2024-10-15 | End: 2024-10-15

## 2024-10-15 RX ORDER — OXYCODONE HYDROCHLORIDE 5 MG/1
5 TABLET ORAL EVERY 6 HOURS PRN
Status: DISCONTINUED | OUTPATIENT
Start: 2024-10-15 | End: 2024-10-16 | Stop reason: HOSPADM

## 2024-10-15 RX ORDER — NALOXONE HYDROCHLORIDE 0.4 MG/ML
INJECTION, SOLUTION INTRAMUSCULAR; INTRAVENOUS; SUBCUTANEOUS PRN
Status: DISCONTINUED | OUTPATIENT
Start: 2024-10-15 | End: 2024-10-15 | Stop reason: HOSPADM

## 2024-10-15 RX ORDER — SENNOSIDES A AND B 8.6 MG/1
1 TABLET, FILM COATED ORAL 2 TIMES DAILY
Status: DISCONTINUED | OUTPATIENT
Start: 2024-10-15 | End: 2024-10-16 | Stop reason: HOSPADM

## 2024-10-15 RX ORDER — FENTANYL CITRATE 50 UG/ML
INJECTION, SOLUTION INTRAMUSCULAR; INTRAVENOUS
Status: DISCONTINUED | OUTPATIENT
Start: 2024-10-15 | End: 2024-10-15 | Stop reason: SDUPTHER

## 2024-10-15 RX ADMIN — FENTANYL CITRATE 50 MCG: 50 INJECTION, SOLUTION INTRAMUSCULAR; INTRAVENOUS at 13:40

## 2024-10-15 RX ADMIN — SODIUM CHLORIDE: 9 INJECTION, SOLUTION INTRAVENOUS at 12:58

## 2024-10-15 RX ADMIN — FENTANYL CITRATE 50 MCG: 50 INJECTION, SOLUTION INTRAMUSCULAR; INTRAVENOUS at 13:05

## 2024-10-15 RX ADMIN — MIDAZOLAM HYDROCHLORIDE 1 MG: 1 INJECTION, SOLUTION INTRAMUSCULAR; INTRAVENOUS at 11:28

## 2024-10-15 RX ADMIN — SODIUM CHLORIDE, POTASSIUM CHLORIDE, SODIUM LACTATE AND CALCIUM CHLORIDE: 600; 310; 30; 20 INJECTION, SOLUTION INTRAVENOUS at 13:39

## 2024-10-15 RX ADMIN — FENTANYL CITRATE 50 MCG: 50 INJECTION INTRAMUSCULAR; INTRAVENOUS at 11:28

## 2024-10-15 RX ADMIN — SUGAMMADEX 250 MG: 100 INJECTION, SOLUTION INTRAVENOUS at 16:08

## 2024-10-15 RX ADMIN — FENTANYL CITRATE 50 MCG: 50 INJECTION, SOLUTION INTRAMUSCULAR; INTRAVENOUS at 14:40

## 2024-10-15 RX ADMIN — WATER 1000 MG: 1 INJECTION INTRAMUSCULAR; INTRAVENOUS; SUBCUTANEOUS at 20:46

## 2024-10-15 RX ADMIN — ACETAMINOPHEN 650 MG: 325 TABLET ORAL at 20:46

## 2024-10-15 RX ADMIN — SODIUM CHLORIDE, POTASSIUM CHLORIDE, SODIUM LACTATE AND CALCIUM CHLORIDE: 600; 310; 30; 20 INJECTION, SOLUTION INTRAVENOUS at 16:08

## 2024-10-15 RX ADMIN — FENTANYL CITRATE 50 MCG: 50 INJECTION, SOLUTION INTRAMUSCULAR; INTRAVENOUS at 13:43

## 2024-10-15 RX ADMIN — ROPIVACAINE HYDROCHLORIDE 40 ML: 5 INJECTION, SOLUTION EPIDURAL; INFILTRATION; PERINEURAL at 11:32

## 2024-10-15 RX ADMIN — METOCLOPRAMIDE 10 MG: 5 INJECTION, SOLUTION INTRAMUSCULAR; INTRAVENOUS at 20:47

## 2024-10-15 RX ADMIN — WATER 2000 MG: 1 INJECTION INTRAMUSCULAR; INTRAVENOUS; SUBCUTANEOUS at 12:58

## 2024-10-15 RX ADMIN — HYDROMORPHONE HYDROCHLORIDE 0.5 MG: 0.5 INJECTION INTRAMUSCULAR; INTRAVENOUS; SUBCUTANEOUS at 16:41

## 2024-10-15 RX ADMIN — LIDOCAINE HYDROCHLORIDE 100 MG: 20 INJECTION, SOLUTION EPIDURAL; INFILTRATION; INTRACAUDAL; PERINEURAL at 13:05

## 2024-10-15 RX ADMIN — FENTANYL CITRATE 50 MCG: 50 INJECTION, SOLUTION INTRAMUSCULAR; INTRAVENOUS at 15:29

## 2024-10-15 RX ADMIN — ROCURONIUM BROMIDE 20 MG: 10 INJECTION, SOLUTION INTRAVENOUS at 14:39

## 2024-10-15 RX ADMIN — OXYCODONE 5 MG: 5 TABLET ORAL at 18:07

## 2024-10-15 RX ADMIN — SENNOSIDES 8.6 MG: 8.6 TABLET, COATED ORAL at 20:45

## 2024-10-15 RX ADMIN — INSULIN LISPRO 1 UNITS: 100 INJECTION, SOLUTION INTRAVENOUS; SUBCUTANEOUS at 20:59

## 2024-10-15 RX ADMIN — SODIUM CHLORIDE: 9 INJECTION, SOLUTION INTRAVENOUS at 13:05

## 2024-10-15 RX ADMIN — POTASSIUM CHLORIDE AND SODIUM CHLORIDE: 450; 150 INJECTION, SOLUTION INTRAVENOUS at 20:29

## 2024-10-15 RX ADMIN — PROPOFOL 150 MG: 10 INJECTION, EMULSION INTRAVENOUS at 13:05

## 2024-10-15 RX ADMIN — DIPHENHYDRAMINE HYDROCHLORIDE 25 MG: 50 INJECTION INTRAMUSCULAR; INTRAVENOUS at 13:16

## 2024-10-15 RX ADMIN — MIDAZOLAM 1 MG: 1 INJECTION INTRAMUSCULAR; INTRAVENOUS at 11:50

## 2024-10-15 RX ADMIN — ROCURONIUM BROMIDE 20 MG: 10 INJECTION, SOLUTION INTRAVENOUS at 14:18

## 2024-10-15 RX ADMIN — VALSARTAN 160 MG: 160 TABLET ORAL at 20:46

## 2024-10-15 RX ADMIN — LIDOCAINE HYDROCHLORIDE 0.2 ML: 10 INJECTION, SOLUTION EPIDURAL; INFILTRATION; INTRACAUDAL; PERINEURAL at 11:55

## 2024-10-15 RX ADMIN — ONDANSETRON 4 MG: 2 INJECTION INTRAMUSCULAR; INTRAVENOUS at 13:16

## 2024-10-15 RX ADMIN — ROCURONIUM BROMIDE 50 MG: 10 INJECTION, SOLUTION INTRAVENOUS at 13:05

## 2024-10-15 RX ADMIN — HYDROCHLOROTHIAZIDE 25 MG: 25 TABLET ORAL at 20:45

## 2024-10-15 RX ADMIN — FENTANYL CITRATE 50 MCG: 50 INJECTION, SOLUTION INTRAMUSCULAR; INTRAVENOUS at 14:21

## 2024-10-15 RX ADMIN — DEXAMETHASONE SODIUM PHOSPHATE 10 MG: 4 INJECTION, SOLUTION INTRAMUSCULAR; INTRAVENOUS at 13:16

## 2024-10-15 RX ADMIN — MANNITOL 12.5 G: 12.5 INJECTION, SOLUTION INTRAVENOUS at 15:00

## 2024-10-15 ASSESSMENT — PAIN SCALES - GENERAL
PAINLEVEL_OUTOF10: 5
PAINLEVEL_OUTOF10: 1
PAINLEVEL_OUTOF10: 2
PAINLEVEL_OUTOF10: 3
PAINLEVEL_OUTOF10: 5

## 2024-10-15 ASSESSMENT — PAIN - FUNCTIONAL ASSESSMENT
PAIN_FUNCTIONAL_ASSESSMENT: 0-10
PAIN_FUNCTIONAL_ASSESSMENT: ACTIVITIES ARE NOT PREVENTED
PAIN_FUNCTIONAL_ASSESSMENT: ACTIVITIES ARE NOT PREVENTED
PAIN_FUNCTIONAL_ASSESSMENT: 0-10
PAIN_FUNCTIONAL_ASSESSMENT: ACTIVITIES ARE NOT PREVENTED
PAIN_FUNCTIONAL_ASSESSMENT: 0-10
PAIN_FUNCTIONAL_ASSESSMENT: 0-10

## 2024-10-15 ASSESSMENT — PAIN DESCRIPTION - ORIENTATION
ORIENTATION: RIGHT;MID
ORIENTATION: ANTERIOR
ORIENTATION: RIGHT;MID
ORIENTATION: RIGHT;MID

## 2024-10-15 ASSESSMENT — PAIN DESCRIPTION - PAIN TYPE: TYPE: SURGICAL PAIN

## 2024-10-15 ASSESSMENT — PAIN DESCRIPTION - DESCRIPTORS
DESCRIPTORS: ACHING;DISCOMFORT;SORE
DESCRIPTORS: DISCOMFORT

## 2024-10-15 ASSESSMENT — PAIN DESCRIPTION - LOCATION
LOCATION: ABDOMEN

## 2024-10-15 ASSESSMENT — PAIN DESCRIPTION - ONSET: ONSET: ON-GOING

## 2024-10-15 ASSESSMENT — PAIN DESCRIPTION - FREQUENCY: FREQUENCY: CONTINUOUS

## 2024-10-15 ASSESSMENT — LIFESTYLE VARIABLES: SMOKING_STATUS: 0

## 2024-10-15 NOTE — ANESTHESIA PRE PROCEDURE
Department of Anesthesiology  Preprocedure Note       Name:  Amelia Gamble   Age:  74 y.o.  :  1950                                          MRN:  [id number]         Date:  10/15/2024      Surgeon: Surgeon(s):  Ben Baig MD    Procedure: Procedure(s):  ROBOTIC XI ASSISTED RIGHT PARTIAL NEPH[facility]OMY [facility])  +++BILATERAL TAP BLOCK+++         +++FORTEC+++    Medications prior to admission:   Prior to Admission medications    Medication Sig Start Date End Date Taking? Authorizing Provider   [facility] [place] device  Dispense #1  Rolling walker 24   Dennys Mcduffie DO   carvedilol (COREG) 6.25 MG tablet TAKE 1 TABLET BY MOUTH  TWICE DAILY 23   Maria E Giang MD   valsartan-hydroCHLOROthiazide (DIOVAN-HCT) 160-25 MG per tablet TAKE 1 TABLET BY MOUTH AT  NIGHT 22   Maria E Giang MD   verapamil (CALAN) 80 MG tablet TAKE 1 TABLET BY MOUTH  DAILY 22   Maria E Giang MD   metFORMIN (GLUCOPHAGE) 500 MG tablet TAKE 1 TABLET BY MOUTH  TWICE DAILY 22   Hal Lugo DO   ACCU-CHEK YAAKOV PLUS strip TEST TWICE DAILY AS NEEDED 9/10/21   ProviderSamia MD   Cholecalciferol (VITAMIN D3) 1000 units CAPS Take by mouth daily Instructed to hold 5 days pre-op    ProviderSamia MD   OMEGA-3 KRILL OIL PO Take 1 tablet by mouth daily Instructed to hold 5 days pre-op    ProviderSamia MD       Current medications:    Current Facility-Administered Medications   Medication Dose Route Frequency Provider Last Rate Last Admin   • sodium chloride flush 0.9 % injection 5-40 mL  5-40 mL IntraVENous 2 times per day Tameka Mora APRN - CNP       • sodium chloride flush 0.9 % injection 5-40 mL  5-40 mL IntraVENous PRN Tameka Mora APRN - CNP       • 0.9 % sodium chloride infusion   IntraVENous PRN Tameka Mora APRN - CNP       • ceFAZolin (ANCEF) 2,000 mg in sterile water 20 mL IV syringe  2,000 mg IntraVENous On Call to OR Tameka Mora

## 2024-10-15 NOTE — ANESTHESIA POSTPROCEDURE EVALUATION
Department of Anesthesiology  Postprocedure Note    Patient: Amelia Gamble  MRN: [id number]  YOB: 1950  Date of evaluation: 10/15/2024    Procedure Summary       Date: 10/15/24 Room / Location: [address]    Anesthesia Start: 1258 Anesthesia Stop:     Procedure: ROBOTIC XI ASSISTED RIGHT PARTIAL NEPHRECTOMY +BILATERAL TAP BLOCK+ (Right: Flank) Diagnosis:       General conditions of the kidney and ureter      Family history of genitourinary disease      (General conditions of the kidney and ureter [N28.9])      (Family history of genitourinary disease [Z84.2])    Surgeons: Ben Baig MD Responsible Provider: Gentry Arzate MD    Anesthesia Type: general ASA Status: 3            Anesthesia Type: No value filed.    Samuel Phase I: Samuel Score: 10    Samuel Phase II:      Anesthesia Post Evaluation    Patient location during evaluation: PACU  Patient participation: complete - patient participated  Level of consciousness: awake  Airway patency: patent  Nausea & Vomiting: no nausea and no vomiting  Cardiovascular status: hemodynamically stable  Respiratory status: acceptable  Hydration status: euvolemic  Pain management: adequate        No notable events documented.

## 2024-10-15 NOTE — H&P
Amelia Gamble is a 74 y.o. female with a right renal mass.    Past Medical History:   Diagnosis Date    Abnormality of heart valve     unknown valve problem per patient , to follow up with primary doctor only    Arthritis     Chronic back pain     Chronic pain of right knee     Class 3 severe obesity due to excess calories with serious comorbidity and body mass index (BMI) of 45.0 to 49.9 in adult     Diabetes mellitus (HCC)     Hypertension     Kidney mass        Past Surgical History:   Procedure Laterality Date    BACK SURGERY      lumbar    CATARACT REMOVAL WITH IMPLANT Right 2017     SECTION      ,    COLONOSCOPY      neg    JOINT REPLACEMENT Left     bilateral hips and right  knee    OTHER SURGICAL HISTORY Left 2017    left eye PE cataract with IOL       Family History   Problem Relation Age of Onset    Diabetes Brother     Stroke Brother     Diabetes Sister        Prior to Admission medications    Medication Sig Start Date End Date Taking? Authorizing Provider   [facility] [place]. Devices MISC Medical device  Dispense #1  Rolling walker 24  Yes Dennys Mcduffie DO   carvedilol (COREG) 6.25 MG tablet TAKE 1 TABLET BY MOUTH  TWICE DAILY 23  Yes Maria E Giang MD   valsartan-hydroCHLOROthiazide (DIOVAN-HCT) 160-25 MG per tablet TAKE 1 TABLET BY MOUTH AT  NIGHT 22  Yes Maria E Giang MD   verapamil (CALAN) 80 MG tablet TAKE 1 TABLET BY MOUTH  DAILY 22  Yes Maria E Giang MD   metFORMIN (GLUCOPHAGE) 500 MG tablet TAKE 1 TABLET BY MOUTH  TWICE DAILY 22  Yes Hal Lugo DO   ACCU-CHEK YAAKOV PLUS strip TEST TWICE DAILY AS NEEDED 9/10/21  Yes Samia Hutton MD   Cholecalciferol (VITAMIN D3) 1000 units CAPS Take by mouth daily Instructed to hold 5 days pre-op   Yes Samia Hutton MD   OMEGA-3 KRILL OIL PO Take 1 tablet by mouth daily Instructed to hold 5 days pre-op   Yes Samia Hutton MD        Allergies: Codeine and Vicodin

## 2024-10-15 NOTE — CONSULTS
[facility]   Consult for Medical Management      Reason for Consult:  Medical management    History of Present Illness:  74 y.o. female with a history of hypertension, diabetes mellitus type 2 morbid obesity arthritis was admitted for robotic assisted right partial nephrectomy right renal mass.  We were consulted for the medical management.  Denies any chest pain, no belly pain, no nausea vomiting diarrhea, no cough or shortness of breath, no lightheadedness or dizziness, no fever or chills.    Informant(s) for H&P:     REVIEW OF SYSTEMS:  Complete ROS performed with patient, pertinent positives and negatives are listed in the HPI.    PMH:  Past Medical History:   Diagnosis Date    Abnormality of heart valve     unknown valve problem per patient , to follow up with primary doctor only    Arthritis     Chronic back pain     Chronic pain of right knee     Class 3 severe obesity due to excess calories with serious comorbidity and body mass index (BMI) of 45.0 to 49.9 in adult     Diabetes mellitus (HCC)     Hypertension     Kidney mass        Surgical History:  Past Surgical History:   Procedure Laterality Date    BACK SURGERY      lumbar    CATARACT REMOVAL WITH IMPLANT Right 2017     SECTION      ,    COLONOSCOPY      neg    JOINT REPLACEMENT Left     bilateral hips and right  knee    OTHER SURGICAL HISTORY Left 2017    left eye PE cataract with IOL       Medications Prior to Admission:    Prior to Admission medications    Medication Sig Start Date End Date Taking? Authorizing Provider   oxyCODONE (ROXICODONE) 5 MG immediate release tablet Take 1 tablet by mouth every 6 hours as needed for Pain for up to 3 days. Intended supply: 3 days. Take lowest dose possible to manage pain Max Daily Amount: 20 mg 10/15/24 10/18/24 Yes Ben Baig MD senna (SENOKOT) 8.6 MG tablet Take 1 tablet by mouth 2 times daily for 20 doses 10/15/24  10/25/24 Yes Ben Baig MD   [facility] [place]. Devices MISC Medical device  Dispense #1  Rolling walker 9/25/24  Yes Dennys Mcduffie DO   carvedilol (COREG) 6.25 MG tablet TAKE 1 TABLET BY MOUTH  TWICE DAILY 7/25/23  Yes Maria E Giang MD   valsartan-hydroCHLOROthiazide (DIOVAN-HCT) 160-25 MG per tablet TAKE 1 TABLET BY MOUTH AT  NIGHT 12/20/22  Yes Maria E Giang MD   verapamil (CALAN) 80 MG tablet TAKE 1 TABLET BY MOUTH  DAILY 12/12/22  Yes Maria E Giang MD   metFORMIN (GLUCOPHAGE) 500 MG tablet TAKE 1 TABLET BY MOUTH  TWICE DAILY 12/5/22  Yes Hal Lugo DO   ACCU-CHEK YAAKOV PLUS strip TEST TWICE DAILY AS NEEDED 9/10/21  Yes Samia Hutton MD   Cholecalciferol (VITAMIN D3) 1000 units CAPS Take by mouth daily Instructed to hold 5 days pre-op   Yes Samia Hutton MD   OMEGA-3 KRILL OIL PO Take 1 tablet by mouth daily Instructed to hold 5 days pre-op   Yes Samia Hutton MD       Allergies:    Codeine and Vicodin [hydrocodone-acetaminophen]    Social History:    reports that she has never smoked. She has never used smokeless tobacco. She reports that she does not drink alcohol and does not use drugs.    Family History:       Problem Relation Age of Onset    Diabetes Brother     Stroke Brother     Diabetes Sister          PHYSICAL EXAM:  Vitals:  /60   Pulse 56   Temp 98.3 °F (36.8 °C) (Oral)   Resp 16   Ht 1.676 m (5' 6\")   Wt 115.2 kg (254 lb)   SpO2 100%   BMI 41.00 kg/m²   General-lying comfortably in bed, pain well-controlled.  CVS S1-S2 normal, rate rhythm regular, no murmur rub or gallop.  Respiratory-clear to auscultation bilaterally no added sounds.  Abdomen-soft, bowel sounds present, with tenderness postsurgery  Extremities-no cyanosis clubbing edema  Neuro-intact      LABS:  No results for input(s): \"NA\", \"K\", \"CL\", \"CO2\", \"BUN\", \"CREATININE\", \"GLUCOSE\", \"CALCIUM\" in the last 72 hours.    No results for input(s): \"WBC\", \"RBC\", \"HGB\", \"HCT\", \"MCV\", \"MCH\", \"MCHC\",

## 2024-10-15 NOTE — OP NOTE
Operative Note      Patient: Amelia Gamble  YOB: 1950  MRN: [id number]    Date of Procedure: 10/15/2024    Pre-Op Diagnosis Codes:      * General conditions of the kidney and ureter [N28.9]     * Family history of genitourinary disease [Z84.2], 3.2 cm right renal mass    Post-Op Diagnosis: Same       Procedure(s):  ROBOTIC XI ASSISTED RIGHT PARTIAL NEPHRECTOMY +BILATERAL TAP BLOCK+    Surgeon(s):  Ben Baig MD    Assistant:   First Assistant: Kehinde Marie    Anesthesia: General    Estimated Blood Loss (mL): less than 50     Complications: None    Specimens:   ID Type Source Tests Collected by Time Destination   A : RIGHT KIDNEY Tissue Tissue SURGICAL PATHOLOGY Ben Baig MD 10/15/2024 1522        Implants:  Implant Name Type Inv. Item Serial No.  Lot No. LRB No. Used Action   CLIP INT M L POLYMER LIBAN LIG HEM O LIBAN - [id number]  CLIP INT M L POLYMER LIBAN LIG HEM O LIBAN  [employer] [id number] Right 3 Implanted   CLIP INT L POLYMER LIBAN LIG HEM O LIBAN (6EA/PK) - [id number]  CLIP INT L POLYMER LIBAN LIG HEM O LIBAN (6EA/PK)  [employer] [id number] Right 6 Implanted         Drains:   Closed/Suction Drain Right RLQ Bulb (Active)   Site Description Clean, dry & intact 10/15/24 1538   Dressing Status New dressing applied;Clean, dry & intact 10/15/24 1538   Drain Status To bulb suction 10/15/24 1538       Urinary Catheter 10/15/24 2 Way (Active)         INDICATION FOR PROCEDURE: Amelia Gamble  is a 74 y.o. female with a renal mass in the posterior upper pole of the right kidney. The patient was given all treatment options, including observation and cryoablation. The patient elected to undergo robot-assisted laparoscopic partial nephrectomy and understands the risks, benefits, and alternatives of the procedure, including the potential for need for nephrectomy versus open conversion, postoperative bleeding and urinoma formation.   The patient understands and accepts

## 2024-10-15 NOTE — PROGRESS NOTES
Arterial line removed pressure held  no evidence of bleeding or hematoma noted pressure dressing applied

## 2024-10-15 NOTE — DISCHARGE INSTRUCTIONS
[facility]  [address]  [place] [id number]  [phone]  FAX [phone]      Postoperative instructions for Robotic Surgery per Dr. Ben Baig    DIET:  -Eat what you like.  -It is less important that you eat over the next week but more important that you drink plenty of fluids    RESTRICTIONS:  -You may shower.  Glue was applied to the skin to allow you to shower.  You do not need to keep Band-Aids over the incisions due to this glue.  -Do not lift anything over 10 pounds for the first week.  After you see Dr. Baig in the office decisions will be made about increasing your lifting slowly over time.  -It is very important that you remain active!  You should walk often at a slow to moderate pace to keep blood flowing and keep from getting blood clots in your lower extremities.  This is not common but can complicate your recovery if you develop a blood clot.  -Do not drive for the first week especially if you are taking narcotic pain medication.  You may be a passenger in a car but may not drive due to risk to yourself and others.    PAIN CONTROL:  -Take 2 Tylenol every 6 hours for the next week whether you are having pain or not.  -Take the prescribed narcotic pain medication as needed for breakthrough pain.  The goal is to keep your pain less than 6 out of 10 if possible.      FOLLOW-UP:  -You should have a follow-up appointment already scheduled.  If you do not contact Dr. Baig's office to schedule a follow-up appointment for next week.    If you have any questions or concerns it is important that you contact Dr. Baig's office.      Ben Baig MD  10/15/2024  4:15 PM

## 2024-10-15 NOTE — ANESTHESIA PROCEDURE NOTES
Arterial Line:    An arterial line was placed using surface landmarks, in the procedure area for the following indication(s): continuous blood pressure monitoring and blood sampling needed.    A 20 gauge (size), 1 and 3/8 inch (length), Arrow (type) catheter was placed, Seldinger technique not used, into the left radial artery, secured by tape and Tegaderm.  Anesthesia type: Local  Local infiltration: Injection    Events:  patient tolerated procedure well with no complications and EBL < 5mL.  Anesthesiologist: Gentry Arzate MD  Resident/CRNA: Edwina Melton APRN - CRNA  Other anesthesia staff: Noel Maciel RN  Performed: Resident/CRNA   Preanesthetic Checklist  Completed: patient identified, IV checked, site marked, risks and benefits discussed, surgical/procedural consents, equipment checked, pre-op evaluation, timeout performed, anesthesia consent given, oxygen available and monitors applied/VS acknowledged

## 2024-10-15 NOTE — ANESTHESIA PROCEDURE NOTES
Peripheral Block    Patient location during procedure: PACU  Reason for block: at surgeon's request  Start time: 10/15/2024 11:32 AM  End time: 10/15/2024 11:40 AM  Staffing  Performed: anesthesiologist   Anesthesiologist: Gentry Arzate MD  Performed by: Gentry Arzate MD  Authorized by: Gentry Arzate MD    Preanesthetic Checklist  Completed: patient identified, IV checked, site marked, risks and benefits discussed, surgical/procedural consents, equipment checked, pre-op evaluation, timeout performed, anesthesia consent given, oxygen available, monitors applied/VS acknowledged, fire risk safety assessment completed and verbalized and blood product R/B/A discussed and consented  Peripheral Block   Patient position: supine  Prep: ChloraPrep  Provider prep: mask and sterile gloves  Patient monitoring: cardiac monitor, continuous pulse ox, frequent blood pressure checks, IV access, oxygen and responsive to questions  Block type: TAP  Laterality: bilateral  Injection technique: single-shot  Guidance: ultrasound guided    Needle   Needle gauge: 22 G  Needle localization: anatomical landmarks and ultrasound guidance  Needle length: 10 cm  Assessment   Injection assessment: negative aspiration for heme and no paresthesia on injection  Paresthesia pain: none  Slow fractionated injection: yes  Hemodynamics: stable  Outcomes: uncomplicated and patient tolerated procedure well    Additional Notes  0.5% Ropivacaine 20 ml each side - Right & Left Flanks (173 &174)    Medications Administered  ropivacaine (NAROPIN) injection 0.5% - Perineural, Flank Left   40 mL - 10/15/2024 11:32:00 AM

## 2024-10-16 VITALS
BODY MASS INDEX: 40.82 KG/M2 | WEIGHT: 254 LBS | HEIGHT: 66 IN | DIASTOLIC BLOOD PRESSURE: 57 MMHG | OXYGEN SATURATION: 99 % | RESPIRATION RATE: 16 BRPM | TEMPERATURE: 98.1 F | SYSTOLIC BLOOD PRESSURE: 114 MMHG | HEART RATE: 67 BPM

## 2024-10-16 PROBLEM — N28.89 RENAL MASS: Status: RESOLVED | Noted: 2024-10-15 | Resolved: 2024-10-16

## 2024-10-16 LAB
ALBUMIN SERPL-MCNC: 3.5 G/DL (ref 3.5–5.2)
ALP SERPL-CCNC: 46 U/L (ref 35–104)
ALT SERPL-CCNC: 31 U/L (ref 0–32)
ANION GAP SERPL CALCULATED.3IONS-SCNC: 13 MMOL/L (ref 7–16)
AST SERPL-CCNC: 30 U/L (ref 0–31)
BASOPHILS # BLD: 0.02 K/UL (ref 0–0.2)
BASOPHILS NFR BLD: 0 % (ref 0–2)
BILIRUB SERPL-MCNC: 0.3 MG/DL (ref 0–1.2)
BUN SERPL-MCNC: 21 MG/DL (ref 6–23)
CALCIUM SERPL-MCNC: 8.3 MG/DL (ref 8.6–10.2)
CHLORIDE SERPL-SCNC: 102 MMOL/L (ref 98–107)
CO2 SERPL-SCNC: 22 MMOL/L (ref 22–29)
CREAT SERPL-MCNC: 0.9 MG/DL (ref 0.5–1)
CREATININE FLUID: 1 MG/DL
EOSINOPHIL # BLD: 0 K/UL (ref 0.05–0.5)
EOSINOPHILS RELATIVE PERCENT: 0 % (ref 0–6)
ERYTHROCYTE [DISTWIDTH] IN BLOOD BY AUTOMATED COUNT: 13.8 % (ref 11.5–15)
GFR, ESTIMATED: 68 ML/MIN/1.73M2
GLUCOSE BLD-MCNC: 167 MG/DL (ref 74–99)
GLUCOSE BLD-MCNC: 212 MG/DL (ref 74–99)
GLUCOSE SERPL-MCNC: 189 MG/DL (ref 74–99)
HCT VFR BLD AUTO: 40.6 % (ref 34–48)
HGB BLD-MCNC: 13.2 G/DL (ref 11.5–15.5)
IMM GRANULOCYTES # BLD AUTO: 0.03 K/UL (ref 0–0.58)
IMM GRANULOCYTES NFR BLD: 0 % (ref 0–5)
LYMPHOCYTES NFR BLD: 1.06 K/UL (ref 1.5–4)
LYMPHOCYTES RELATIVE PERCENT: 9 % (ref 20–42)
MCH RBC QN AUTO: 30.7 PG (ref 26–35)
MCHC RBC AUTO-ENTMCNC: 32.5 G/DL (ref 32–34.5)
MCV RBC AUTO: 94.4 FL (ref 80–99.9)
MONOCYTES NFR BLD: 0.64 K/UL (ref 0.1–0.95)
MONOCYTES NFR BLD: 5 % (ref 2–12)
NEUTROPHILS NFR BLD: 86 % (ref 43–80)
NEUTS SEG NFR BLD: 10.31 K/UL (ref 1.8–7.3)
PLATELET # BLD AUTO: 203 K/UL (ref 130–450)
PMV BLD AUTO: 9.9 FL (ref 7–12)
POTASSIUM SERPL-SCNC: 3.9 MMOL/L (ref 3.5–5)
PROT SERPL-MCNC: 6.2 G/DL (ref 6.4–8.3)
RBC # BLD AUTO: 4.3 M/UL (ref 3.5–5.5)
SODIUM SERPL-SCNC: 137 MMOL/L (ref 132–146)
SPECIMEN TYPE: NORMAL
WBC OTHER # BLD: 12.1 K/UL (ref 4.5–11.5)

## 2024-10-16 PROCEDURE — 82962 GLUCOSE BLOOD TEST: CPT

## 2024-10-16 PROCEDURE — 36415 COLL VENOUS BLD VENIPUNCTURE: CPT

## 2024-10-16 PROCEDURE — 6370000000 HC RX 637 (ALT 250 FOR IP): Performed by: UROLOGY

## 2024-10-16 PROCEDURE — 85025 COMPLETE CBC W/AUTO DIFF WBC: CPT

## 2024-10-16 PROCEDURE — 6360000002 HC RX W HCPCS: Performed by: UROLOGY

## 2024-10-16 PROCEDURE — 80053 COMPREHEN METABOLIC PANEL: CPT

## 2024-10-16 PROCEDURE — 2580000003 HC RX 258: Performed by: UROLOGY

## 2024-10-16 PROCEDURE — 82570 ASSAY OF URINE CREATININE: CPT

## 2024-10-16 PROCEDURE — 99232 SBSQ HOSP IP/OBS MODERATE 35: CPT | Performed by: STUDENT IN AN ORGANIZED HEALTH CARE EDUCATION/TRAINING PROGRAM

## 2024-10-16 RX ORDER — INSULIN LISPRO 100 [IU]/ML
0-8 INJECTION, SOLUTION INTRAVENOUS; SUBCUTANEOUS
Status: DISCONTINUED | OUTPATIENT
Start: 2024-10-16 | End: 2024-10-16 | Stop reason: HOSPADM

## 2024-10-16 RX ADMIN — METOCLOPRAMIDE 10 MG: 5 INJECTION, SOLUTION INTRAMUSCULAR; INTRAVENOUS at 08:36

## 2024-10-16 RX ADMIN — POTASSIUM CHLORIDE AND SODIUM CHLORIDE: 450; 150 INJECTION, SOLUTION INTRAVENOUS at 04:38

## 2024-10-16 RX ADMIN — SENNOSIDES 8.6 MG: 8.6 TABLET, COATED ORAL at 08:36

## 2024-10-16 RX ADMIN — VERAPAMIL HYDROCHLORIDE 80 MG: 80 TABLET ORAL at 08:36

## 2024-10-16 RX ADMIN — CARVEDILOL 6.25 MG: 6.25 TABLET, FILM COATED ORAL at 08:36

## 2024-10-16 RX ADMIN — ACETAMINOPHEN 650 MG: 325 TABLET ORAL at 08:36

## 2024-10-16 RX ADMIN — WATER 1000 MG: 1 INJECTION INTRAMUSCULAR; INTRAVENOUS; SUBCUTANEOUS at 05:22

## 2024-10-16 ASSESSMENT — PAIN DESCRIPTION - LOCATION: LOCATION: ABDOMEN

## 2024-10-16 ASSESSMENT — PAIN SCALES - GENERAL: PAINLEVEL_OUTOF10: 1

## 2024-10-16 ASSESSMENT — PAIN DESCRIPTION - DESCRIPTORS: DESCRIPTORS: SORE;OTHER (COMMENT)

## 2024-10-16 NOTE — PLAN OF CARE
Problem: Discharge Planning  Goal: Discharge to home or other facility with appropriate resources  10/16/2024 1201 by Gaby Kaiser RN  Outcome: Adequate for Discharge  10/16/2024 0309 by Jessy Sanchez RN  Outcome: Progressing     Problem: Pain  Goal: Verbalizes/displays adequate comfort level or baseline comfort level  10/16/2024 1201 by Gaby Kaiser RN  Outcome: Adequate for Discharge  10/16/2024 0309 by Jessy Sanchez RN  Outcome: Progressing     Problem: Chronic Conditions and Co-morbidities  Goal: Patient's chronic conditions and co-morbidity symptoms are monitored and maintained or improved  10/16/2024 1201 by Gaby Kaiser RN  Outcome: Adequate for Discharge  10/16/2024 0309 by Jessy Sanchez RN  Outcome: Progressing     Problem: Safety - Adult  Goal: Free from fall injury  10/16/2024 1201 by Gaby Kaiser RN  Outcome: Adequate for Discharge  10/16/2024 0309 by Jessy Sanchez RN  Outcome: Progressing     Problem: ABCDS Injury Assessment  Goal: Absence of physical injury  10/16/2024 1201 by Gaby Kaiser RN  Outcome: Adequate for Discharge  10/16/2024 0309 by Jessy Sanchez RN  Outcome: Progressing

## 2024-10-16 NOTE — PROGRESS NOTES
Spiritual Health History and Assessment/Progress Note  [facility]    Loneliness/Social Isolation,  ,  ,      Name: Amelia Gamble MRN: [id number]    Age: 74 y.o.     Sex: female   Language: English   Oriental orthodox: Unknown   Renal mass     Date: 10/16/2024                           Spiritual Assessment began in [facility] INTERMDIATE MED SURG        Referral/Consult From: Rounding   Encounter Overview/Reason: Loneliness/Social Isolation  Service Provided For: Patient and family together    Gisella, Belief, Meaning:   Patient identifies as spiritual, is connected with a gisella tradition or spiritual practice, and has beliefs or practices that help with coping during difficult times  Family/Friends identify as spiritual, are connected with a gisella tradition or spiritual practice, and have beliefs or practices that help with coping during difficult times      Importance and Influence:  Patient has spiritual/personal beliefs that influence decisions regarding their health  Family/Friends have spiritual/personal beliefs that influence decisions regarding the patient's health    Community:  Patient feels well-supported. Support system includes: Spouse/Partner, Children, and Extended family  Family/Friends feel well-supported. Support system includes: Spouse/Partner, Children, and Extended family    Assessment and Plan of Care:     Patient Interventions include: Facilitated expression of thoughts and feelings and Affirmed coping skills/support systems  Family/Friends Interventions include: Facilitated expression of thoughts and feelings and Affirmed coping skills/support systems    Patient Plan of Care: Spiritual Care available upon further referral  Family/Friends Plan of Care: Spiritual Care available upon further referral    Electronically signed by Chaplain Marin on 10/16/2024 at 10:47 AM

## 2024-10-16 NOTE — ACP (ADVANCE CARE PLANNING)
Advance Care Planning   Healthcare Decision Maker:    Primary Decision Maker: Nilson Morejon - Son-in-Law - [phone]    Primary Decision Maker: Elie Gamble - Spouse - [phone]    Click here to complete Healthcare Decision Makers including selection of the Healthcare Decision Maker Relationship (ie \"Primary\").
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
## 2024-10-16 NOTE — CARE COORDINATION
Met with patient about diagnosis and discharge plan of care. POD#1 right partial nephrectomy. MICHELLE drain to be removed. Am labs. Tolerating diet. Pt lives with spouse in bilevel home. Has cane and wheeled walker she uses as needed. PCP is dr Miles. Plan for discharge today. Declining needs-felipe

## 2024-10-16 NOTE — PROGRESS NOTES
10/16/2024 7:16 AM  Amelia Gamble  [id number]    Subjective:      Awake and alert.  Ambulated well.  No fever, chills, nausea, vomiting.  Eating breakfast.      Review of Systems  Constitutional: No fever or chills   Respiratory: negative for cough and hemoptysis  Cardiovascular: negative for chest pain and dyspnea  Gastrointestinal: negative for abdominal pain, diarrhea, nausea and vomiting   : See above  Derm: negative for rash and skin lesion(s)  Neurological: negative for seizures and tremors  Musculoskeletal: Negative    Psychiatric: Negative   All other reviews are negative      Scheduled Meds:   acetaminophen  650 mg Oral 4x Daily    ceFAZolin  1,000 mg IntraVENous Q8H    senna  1 tablet Oral BID    metoclopramide  10 mg IntraVENous BID    verapamil  80 mg Oral Daily    carvedilol  6.25 mg Oral BID    insulin lispro  0-4 Units SubCUTAneous 4x Daily AC & HS    valsartan  160 mg Oral Nightly    And    hydroCHLOROthiazide  25 mg Oral Nightly       Objective:  Vitals:    10/16/24 0440   BP: 109/60   Pulse: 74   Resp: 16   Temp: 97.7 °F (36.5 °C)   SpO2:          Allergies: Codeine and Vicodin [hydrocodone-acetaminophen]    General Appearance: alert and oriented to person, place and time and in no acute distress  Skin: no rash or erythema  Head: normocephalic and atraumatic  Pulmonary/Chest: normal air movement, no respiratory distress  Abdomen: soft, non-tender, non-distended,   incisions, clean, dry, intact  MICHELLE serosanguineous.  Creatinine pending        Labs:     Recent Labs     10/16/24  0422      K 3.9      CO2 22   BUN 21   CREATININE 0.9   GLUCOSE 189*   CALCIUM 8.3*       Lab Results   Component Value Date/Time    HGB 13.2 10/16/2024 04:22 AM    HCT 40.6 10/16/2024 04:22 AM       No results found for: \"PSA\"      Assessment/Plan:  POD #1 s/p robotic Xi assisted right partial nephrectomy    Hemoglobin stable at 13.2  Creatinine stable at 0.9    Await MICHELLE creatinine.  Removed MICHELLE if consistent

## 2024-10-16 NOTE — PROGRESS NOTES
[facility] Hospitalist Progress Note    Admitting Date and Time: 10/15/2024  9:36 AM  Admit Dx: General conditions of the kidney and ureter [N28.9]  Family history of genitourinary disease [Z84.2]  Renal mass [N28.89]    Subjective:  Patient is being followed for General conditions of the kidney and ureter [N28.9]  Family history of genitourinary disease [Z84.2]  Renal mass [N28.89]   Pt feels okay  Per RN: no additional concerns    ROS: denies fever, chills, cp, sob, n/v, HA unless otherwise noted above     acetaminophen  650 mg Oral 4x Daily    ceFAZolin  1,000 mg IntraVENous Q8H    senna  1 tablet Oral BID    metoclopramide  10 mg IntraVENous BID    verapamil  80 mg Oral Daily    carvedilol  6.25 mg Oral BID    insulin lispro  0-4 Units SubCUTAneous 4x Daily AC & HS    valsartan  160 mg Oral Nightly    And    hydroCHLOROthiazide  25 mg Oral Nightly     morphine, 2 mg, Q4H PRN  oxyCODONE, 5 mg, Q6H PRN  glucose, 4 tablet, PRN  dextrose bolus, 125 mL, PRN   Or  dextrose bolus, 250 mL, PRN  glucagon (rDNA), 1 mg, PRN  dextrose, , Continuous PRN         Objective:  BP (!) 114/57   Pulse 67   Temp 98.1 °F (36.7 °C) (Oral)   Resp 16   Ht 1.676 m (5' 6\")   Wt 115.2 kg (254 lb)   SpO2 99%   BMI 41.00 kg/m²     General Appearance: alert and oriented to person, place and time and in NAD, sitting in bed  Skin: warm and dry  Head: normocephalic and atraumatic  Eyes: PERRL, EOMI, conjunctivae normal  Neck: neck supple, trachea midline   Pulmonary/Chest: CTAB, no w/r/r, normal air movement, no respiratory distress, RA  Cardiovascular: RRR, no murmurs  Abdomen: soft, mild right-sided ttp, RUQ drain with bloody drainage  Extremities: no cyanosis, no clubbing and no edema  Neurologic: no cranial nerve deficit and speech normal      Recent Labs     10/16/24  0422      K 3.9      CO2 22   BUN 21   CREATININE 0.9   GLUCOSE 189*   CALCIUM 8.3*       Recent Labs     10/16/24  0422   WBC 12.1*   RBC 4.30    HGB 13.2   HCT 40.6   MCV 94.4   MCH 30.7   MCHC 32.5   RDW 13.8      MPV 9.9       Radiology:  No orders to display        Assessment:  Principal Problem:    Renal mass  Resolved Problems:    * No resolved hospital problems. *      Plan:  Renal mass- s/p robotic assisted right partial nephrectomy, per Uro  Primary hypertension- blood pressure well-controlled, continue home medications  Diabetes mellitus type 2- hold metformin, start insulin sliding scale, hypoglycemia protocol, diabetic diet and follow  Chronic back pain- pain control per Uro    -BP labile, cont home meds for now, may require outpt adjustment  -BG elevated but near inpatient goal, cont to monitor and adjust insulin while inpt, resume home meds at dispo    Dispo: anticipate home today, per Uro    NOTE: Portions of this report may have been transcribed using voice recognition software. Every effort was made to ensure accuracy; however, inadvertent computerized transcription errors may be present.  Electronically signed by Richar Gonzalez MD on 10/16/2024 at 11:31 AM

## 2024-10-16 NOTE — PROGRESS NOTES
Patient ambulated in cooley throughout the night and tolerated ambulation well. Watson was removed at 2000. Patient due to void between 0230 and 0430.

## 2024-10-16 NOTE — PATIENT CARE CONFERENCE
[facility] Quality Flow/Interdisciplinary Rounds Progress Note        Quality Flow Rounds held on October 16, 2024    Disciplines Attending:  Bedside Nurse, , , and Nursing Unit Leadership    Amelia Gamble was admitted on 10/15/2024  9:36 AM    Anticipated Discharge Date:       Disposition:    Jarrell Score:  Jarrell Scale Score: 19    Readmission Risk              Risk of Unplanned Readmission:  9           Discussed patient goal for the day, patient clinical progression, and barriers to discharge.  The following Goal(s) of the Day/Commitment(s) have been identified:  discharge plan is home today      Hortencia Vasquez RN  October 16, 2024

## 2024-10-23 LAB
ABO/RH: NORMAL
ANTIBODY SCREEN: NEGATIVE
ARM BAND NUMBER: NORMAL
BLOOD BANK DISPENSE STATUS: NORMAL
BLOOD BANK DISPENSE STATUS: NORMAL
BLOOD BANK SAMPLE EXPIRATION: NORMAL
BPU ID: NORMAL
BPU ID: NORMAL
COMPONENT: NORMAL
COMPONENT: NORMAL
CROSSMATCH RESULT: NORMAL
CROSSMATCH RESULT: NORMAL
TRANSFUSION STATUS: NORMAL
TRANSFUSION STATUS: NORMAL
UNIT DIVISION: 0
UNIT DIVISION: 0

## 2024-11-04 LAB — SURGICAL PATHOLOGY REPORT: NORMAL

## (undated) DEVICE — SPONGE LAP W18XL18IN WHT COT 4 PLY FLD STRUNG RADPQ DISP ST 2 PER PACK

## (undated) DEVICE — GOWN,SIRUS,POLYRNF,BRTHSLV,XLN/XL,20/CS: Brand: MEDLINE

## (undated) DEVICE — LARGE NEEDLE DRIVER: Brand: ENDOWRIST

## (undated) DEVICE — APPLICATOR MEDICATED 26 CC SOLUTION HI LT ORNG CHLORAPREP

## (undated) DEVICE — GOWN,SIRUS,FABRNF,XL,20/CS: Brand: MEDLINE

## (undated) DEVICE — DRAPE,TOP,102X53,STERILE: Brand: MEDLINE

## (undated) DEVICE — SOLUTION IV 1000ML 0.9% SOD CHL PH 5 INJ USP VIAFLX PLAS

## (undated) DEVICE — SYRINGE 20ML LL S/C 50

## (undated) DEVICE — WIPES SKIN CLOTH READYPREP 9 X 10.5 IN 2% CHLORHEX GLUCONATE CHG PREOP

## (undated) DEVICE — ANCHOR TISSUE RETRIEVAL SYSTEM, BAG SIZE 175 ML, PORT SIZE 10 MM: Brand: ANCHOR TISSUE RETRIEVAL SYSTEM

## (undated) DEVICE — TIP COVER ACCESSORY

## (undated) DEVICE — KIT,ANTI FOG,W/SPONGE & FLUID,SOFT PACK: Brand: MEDLINE

## (undated) DEVICE — ARM DRAPE

## (undated) DEVICE — ELECTRODE PT RET AD L9FT HI MOIST COND ADH HYDRGEL CORDED

## (undated) DEVICE — LOOP VES W25MM THK1MM MAXI RED SIL FLD REPELLENT 100 PER

## (undated) DEVICE — BLADELESS OBTURATOR: Brand: WECK VISTA

## (undated) DEVICE — WARMER SCP 2 ANTIFOG LAP DISP

## (undated) DEVICE — PUMP SUC IRR TBNG L10FT W/ HNDPC ASSEMB STRYKEFLOW 2

## (undated) DEVICE — GLOVE SURG SZ 75 L12IN FNGR THK79MIL GRN LTX FREE

## (undated) DEVICE — APPLICATOR LAP 45CM FLX 2 VISTASEAL

## (undated) DEVICE — AGENT HEMSTAT W2XL4IN OXIDIZED REGENERATED CELOS ABSRB

## (undated) DEVICE — TROCAR: Brand: KII FIOS FIRST ENTRY

## (undated) DEVICE — ENDOSCOPIC KIT CLN SWAB MICROFIBER CLTH SCP CLEANOR DISP

## (undated) DEVICE — PROGRASP FORCEPS: Brand: ENDOWRIST

## (undated) DEVICE — LIQUIBAND RAPID ADHESIVE 36/CS 0.8ML: Brand: MEDLINE

## (undated) DEVICE — DRAIN SURG 15FR L3 16IN DIA47MM SIL RND HUBLESS FULL FLUT

## (undated) DEVICE — NEEDLE HYPO 23GA L1.5IN TURQ POLYPR HUB S STL THN WALL IM

## (undated) DEVICE — DRAPE THER FLUID WARMING 66X44 IN FLAT SLUSH DBL DISC ORS

## (undated) DEVICE — SPONGE,DRAIN,NONWVN,4"X4",6PLY,STRL,LF: Brand: MEDLINE

## (undated) DEVICE — SEAL

## (undated) DEVICE — 3M™ IOBAN™ 2 ANTIMICROBIAL INCISE DRAPE 6650EZ: Brand: IOBAN™ 2

## (undated) DEVICE — MARYLAND BIPOLAR FORCEPS: Brand: ENDOWRIST

## (undated) DEVICE — MONOPOLAR CURVED SCISSORS: Brand: ENDOWRIST

## (undated) DEVICE — DRAPE,LAP,CHOLE,W/TROUGHS,STERILE: Brand: MEDLINE

## (undated) DEVICE — INSUFFLATION NEEDLE TO ESTABLISH PNEUMOPERITONEUM.: Brand: INSUFFLATION NEEDLE

## (undated) DEVICE — AIRSEAL 12 MM ACCESS PORT AND PALM GRIP OBTURATOR WITH BLADELESS OPTICAL TIP, 150 MM LENGTH: Brand: AIRSEAL

## (undated) DEVICE — 1LYRTR 16FR10ML 100%SILI SNAP: Brand: MEDLINE INDUSTRIES, INC.

## (undated) DEVICE — TOWEL,OR,DSP,ST,BLUE,STD,6/PK,12PK/CS: Brand: MEDLINE

## (undated) DEVICE — GLOVE ORANGE PI 7 1/2   MSG9075

## (undated) DEVICE — Device

## (undated) DEVICE — COLUMN DRAPE